# Patient Record
Sex: MALE | Race: WHITE | NOT HISPANIC OR LATINO | Employment: FULL TIME | URBAN - METROPOLITAN AREA
[De-identification: names, ages, dates, MRNs, and addresses within clinical notes are randomized per-mention and may not be internally consistent; named-entity substitution may affect disease eponyms.]

---

## 2021-09-10 ENCOUNTER — HOSPITAL ENCOUNTER (OUTPATIENT)
Dept: RADIOLOGY | Facility: HOSPITAL | Age: 36
Discharge: HOME | End: 2021-09-10
Attending: NURSE PRACTITIONER
Payer: COMMERCIAL

## 2021-09-10 ENCOUNTER — OFFICE VISIT (OUTPATIENT)
Dept: INTERNAL MEDICINE | Facility: CLINIC | Age: 36
End: 2021-09-10
Payer: COMMERCIAL

## 2021-09-10 VITALS
BODY MASS INDEX: 26.64 KG/M2 | DIASTOLIC BLOOD PRESSURE: 86 MMHG | HEART RATE: 73 BPM | HEIGHT: 71 IN | OXYGEN SATURATION: 97 % | WEIGHT: 190.25 LBS | SYSTOLIC BLOOD PRESSURE: 106 MMHG

## 2021-09-10 DIAGNOSIS — M25.562 CHRONIC PAIN OF BOTH KNEES: ICD-10-CM

## 2021-09-10 DIAGNOSIS — M25.561 CHRONIC PAIN OF BOTH KNEES: ICD-10-CM

## 2021-09-10 DIAGNOSIS — G89.29 CHRONIC PAIN OF BOTH KNEES: ICD-10-CM

## 2021-09-10 DIAGNOSIS — S29.9XXA TRAUMA OF CHEST, INITIAL ENCOUNTER: ICD-10-CM

## 2021-09-10 DIAGNOSIS — Z98.890 S/P ACL REPAIR: ICD-10-CM

## 2021-09-10 DIAGNOSIS — S20.221A CONTUSION OF RIGHT BACK WALL OF THORAX, INITIAL ENCOUNTER: ICD-10-CM

## 2021-09-10 DIAGNOSIS — Z98.890 S/P ACL REPAIR: Primary | ICD-10-CM

## 2021-09-10 PROCEDURE — 99999 PR PBB SHADOW E&M-NEW PATIENT-LVL III: ICD-10-PCS | Mod: PBBFAC,,, | Performed by: NURSE PRACTITIONER

## 2021-09-10 PROCEDURE — 1160F PR REVIEW ALL MEDS BY PRESCRIBER/CLIN PHARMACIST DOCUMENTED: ICD-10-PCS | Mod: CPTII,S$GLB,, | Performed by: NURSE PRACTITIONER

## 2021-09-10 PROCEDURE — 73562 X-RAY EXAM OF KNEE 3: CPT | Mod: 26,,, | Performed by: RADIOLOGY

## 2021-09-10 PROCEDURE — 99999 PR PBB SHADOW E&M-NEW PATIENT-LVL III: CPT | Mod: PBBFAC,,, | Performed by: NURSE PRACTITIONER

## 2021-09-10 PROCEDURE — 3079F DIAST BP 80-89 MM HG: CPT | Mod: CPTII,S$GLB,, | Performed by: NURSE PRACTITIONER

## 2021-09-10 PROCEDURE — 3074F SYST BP LT 130 MM HG: CPT | Mod: CPTII,S$GLB,, | Performed by: NURSE PRACTITIONER

## 2021-09-10 PROCEDURE — 1159F MED LIST DOCD IN RCRD: CPT | Mod: CPTII,S$GLB,, | Performed by: NURSE PRACTITIONER

## 2021-09-10 PROCEDURE — 3008F BODY MASS INDEX DOCD: CPT | Mod: CPTII,S$GLB,, | Performed by: NURSE PRACTITIONER

## 2021-09-10 PROCEDURE — 1160F RVW MEDS BY RX/DR IN RCRD: CPT | Mod: CPTII,S$GLB,, | Performed by: NURSE PRACTITIONER

## 2021-09-10 PROCEDURE — 73562 X-RAY EXAM OF KNEE 3: CPT | Mod: TC,50

## 2021-09-10 PROCEDURE — 1159F PR MEDICATION LIST DOCUMENTED IN MEDICAL RECORD: ICD-10-PCS | Mod: CPTII,S$GLB,, | Performed by: NURSE PRACTITIONER

## 2021-09-10 PROCEDURE — 3074F PR MOST RECENT SYSTOLIC BLOOD PRESSURE < 130 MM HG: ICD-10-PCS | Mod: CPTII,S$GLB,, | Performed by: NURSE PRACTITIONER

## 2021-09-10 PROCEDURE — 3079F PR MOST RECENT DIASTOLIC BLOOD PRESSURE 80-89 MM HG: ICD-10-PCS | Mod: CPTII,S$GLB,, | Performed by: NURSE PRACTITIONER

## 2021-09-10 PROCEDURE — 99204 PR OFFICE/OUTPT VISIT, NEW, LEVL IV, 45-59 MIN: ICD-10-PCS | Mod: S$GLB,,, | Performed by: NURSE PRACTITIONER

## 2021-09-10 PROCEDURE — 73562 XR KNEE ORTHO BILAT: ICD-10-PCS | Mod: 26,,, | Performed by: RADIOLOGY

## 2021-09-10 PROCEDURE — 3008F PR BODY MASS INDEX (BMI) DOCUMENTED: ICD-10-PCS | Mod: CPTII,S$GLB,, | Performed by: NURSE PRACTITIONER

## 2021-09-10 PROCEDURE — 99204 OFFICE O/P NEW MOD 45 MIN: CPT | Mod: S$GLB,,, | Performed by: NURSE PRACTITIONER

## 2021-09-24 ENCOUNTER — HOSPITAL ENCOUNTER (OUTPATIENT)
Dept: RADIOLOGY | Facility: HOSPITAL | Age: 36
Discharge: HOME | End: 2021-09-24
Attending: ORTHOPAEDIC SURGERY
Payer: COMMERCIAL

## 2021-09-24 ENCOUNTER — OFFICE VISIT (OUTPATIENT)
Dept: SPORTS MEDICINE | Facility: CLINIC | Age: 36
End: 2021-09-24
Payer: COMMERCIAL

## 2021-09-24 VITALS
HEART RATE: 68 BPM | SYSTOLIC BLOOD PRESSURE: 129 MMHG | HEIGHT: 71 IN | WEIGHT: 191 LBS | BODY MASS INDEX: 26.74 KG/M2 | DIASTOLIC BLOOD PRESSURE: 77 MMHG

## 2021-09-24 DIAGNOSIS — Z98.890 S/P ACL REPAIR: ICD-10-CM

## 2021-09-24 DIAGNOSIS — M25.562 CHRONIC PAIN OF BOTH KNEES: ICD-10-CM

## 2021-09-24 DIAGNOSIS — M25.561 CHRONIC PAIN OF BOTH KNEES: ICD-10-CM

## 2021-09-24 DIAGNOSIS — G89.29 CHRONIC PAIN OF BOTH KNEES: ICD-10-CM

## 2021-09-24 PROCEDURE — 99203 OFFICE O/P NEW LOW 30 MIN: CPT | Mod: S$GLB,,, | Performed by: ORTHOPAEDIC SURGERY

## 2021-09-24 PROCEDURE — 99999 PR PBB SHADOW E&M-EST. PATIENT-LVL III: CPT | Mod: PBBFAC,,, | Performed by: ORTHOPAEDIC SURGERY

## 2021-09-24 PROCEDURE — 73564 X-RAY EXAM KNEE 4 OR MORE: CPT | Mod: TC,50

## 2021-09-24 PROCEDURE — 1159F MED LIST DOCD IN RCRD: CPT | Mod: CPTII,S$GLB,, | Performed by: ORTHOPAEDIC SURGERY

## 2021-09-24 PROCEDURE — 73564 X-RAY EXAM KNEE 4 OR MORE: CPT | Mod: 26,,, | Performed by: RADIOLOGY

## 2021-09-24 PROCEDURE — 3078F PR MOST RECENT DIASTOLIC BLOOD PRESSURE < 80 MM HG: ICD-10-PCS | Mod: CPTII,S$GLB,, | Performed by: ORTHOPAEDIC SURGERY

## 2021-09-24 PROCEDURE — 3008F PR BODY MASS INDEX (BMI) DOCUMENTED: ICD-10-PCS | Mod: CPTII,S$GLB,, | Performed by: ORTHOPAEDIC SURGERY

## 2021-09-24 PROCEDURE — 3008F BODY MASS INDEX DOCD: CPT | Mod: CPTII,S$GLB,, | Performed by: ORTHOPAEDIC SURGERY

## 2021-09-24 PROCEDURE — 99203 PR OFFICE/OUTPT VISIT, NEW, LEVL III, 30-44 MIN: ICD-10-PCS | Mod: S$GLB,,, | Performed by: ORTHOPAEDIC SURGERY

## 2021-09-24 PROCEDURE — 1159F PR MEDICATION LIST DOCUMENTED IN MEDICAL RECORD: ICD-10-PCS | Mod: CPTII,S$GLB,, | Performed by: ORTHOPAEDIC SURGERY

## 2021-09-24 PROCEDURE — 3074F PR MOST RECENT SYSTOLIC BLOOD PRESSURE < 130 MM HG: ICD-10-PCS | Mod: CPTII,S$GLB,, | Performed by: ORTHOPAEDIC SURGERY

## 2021-09-24 PROCEDURE — 73564 XR KNEE ORTHO BILAT WITH FLEXION: ICD-10-PCS | Mod: 26,,, | Performed by: RADIOLOGY

## 2021-09-24 PROCEDURE — 3078F DIAST BP <80 MM HG: CPT | Mod: CPTII,S$GLB,, | Performed by: ORTHOPAEDIC SURGERY

## 2021-09-24 PROCEDURE — 3074F SYST BP LT 130 MM HG: CPT | Mod: CPTII,S$GLB,, | Performed by: ORTHOPAEDIC SURGERY

## 2021-09-24 PROCEDURE — 99999 PR PBB SHADOW E&M-EST. PATIENT-LVL III: ICD-10-PCS | Mod: PBBFAC,,, | Performed by: ORTHOPAEDIC SURGERY

## 2021-10-04 ENCOUNTER — TELEPHONE (OUTPATIENT)
Dept: SPORTS MEDICINE | Facility: CLINIC | Age: 36
End: 2021-10-04

## 2021-10-04 DIAGNOSIS — M25.562 CHRONIC PAIN OF BOTH KNEES: Primary | ICD-10-CM

## 2021-10-04 DIAGNOSIS — M25.561 CHRONIC PAIN OF BOTH KNEES: Primary | ICD-10-CM

## 2021-10-04 DIAGNOSIS — G89.29 CHRONIC PAIN OF BOTH KNEES: Primary | ICD-10-CM

## 2021-10-12 ENCOUNTER — OFFICE VISIT (OUTPATIENT)
Dept: DERMATOLOGY | Facility: CLINIC | Age: 36
End: 2021-10-12
Payer: COMMERCIAL

## 2021-10-12 DIAGNOSIS — B36.0 TINEA VERSICOLOR: Primary | ICD-10-CM

## 2021-10-12 PROCEDURE — 99999 PR PBB SHADOW E&M-EST. PATIENT-LVL II: CPT | Mod: PBBFAC,,, | Performed by: DERMATOLOGY

## 2021-10-12 PROCEDURE — 99204 OFFICE O/P NEW MOD 45 MIN: CPT | Mod: S$GLB,,, | Performed by: DERMATOLOGY

## 2021-10-12 PROCEDURE — 1160F PR REVIEW ALL MEDS BY PRESCRIBER/CLIN PHARMACIST DOCUMENTED: ICD-10-PCS | Mod: CPTII,S$GLB,, | Performed by: DERMATOLOGY

## 2021-10-12 PROCEDURE — 99204 PR OFFICE/OUTPT VISIT, NEW, LEVL IV, 45-59 MIN: ICD-10-PCS | Mod: S$GLB,,, | Performed by: DERMATOLOGY

## 2021-10-12 PROCEDURE — 1159F MED LIST DOCD IN RCRD: CPT | Mod: CPTII,S$GLB,, | Performed by: DERMATOLOGY

## 2021-10-12 PROCEDURE — 1159F PR MEDICATION LIST DOCUMENTED IN MEDICAL RECORD: ICD-10-PCS | Mod: CPTII,S$GLB,, | Performed by: DERMATOLOGY

## 2021-10-12 PROCEDURE — 99999 PR PBB SHADOW E&M-EST. PATIENT-LVL II: ICD-10-PCS | Mod: PBBFAC,,, | Performed by: DERMATOLOGY

## 2021-10-12 PROCEDURE — 1160F RVW MEDS BY RX/DR IN RCRD: CPT | Mod: CPTII,S$GLB,, | Performed by: DERMATOLOGY

## 2021-10-12 RX ORDER — KETOCONAZOLE 20 MG/ML
SHAMPOO, SUSPENSION TOPICAL
Qty: 120 ML | Refills: 5 | Status: SHIPPED | OUTPATIENT
Start: 2021-10-12

## 2021-10-12 RX ORDER — KETOCONAZOLE 20 MG/G
CREAM TOPICAL 2 TIMES DAILY
Qty: 60 G | Refills: 3 | Status: SHIPPED | OUTPATIENT
Start: 2021-10-12

## 2021-10-13 ENCOUNTER — HOSPITAL ENCOUNTER (OUTPATIENT)
Dept: RADIOLOGY | Facility: HOSPITAL | Age: 36
Discharge: HOME | End: 2021-10-13
Attending: ORTHOPAEDIC SURGERY
Payer: COMMERCIAL

## 2021-10-13 DIAGNOSIS — M25.562 CHRONIC PAIN OF BOTH KNEES: ICD-10-CM

## 2021-10-13 DIAGNOSIS — G89.29 CHRONIC PAIN OF BOTH KNEES: ICD-10-CM

## 2021-10-13 DIAGNOSIS — Z98.890 S/P ACL REPAIR: ICD-10-CM

## 2021-10-13 DIAGNOSIS — M25.561 CHRONIC PAIN OF BOTH KNEES: ICD-10-CM

## 2021-10-13 PROCEDURE — 73721 MRI JNT OF LWR EXTRE W/O DYE: CPT | Mod: 26,LT,, | Performed by: RADIOLOGY

## 2021-10-13 PROCEDURE — 73721 MRI KNEE WITHOUT CONTRAST RIGHT: ICD-10-PCS | Mod: 26,RT,, | Performed by: RADIOLOGY

## 2021-10-13 PROCEDURE — 73721 MRI JNT OF LWR EXTRE W/O DYE: CPT | Mod: TC,LT

## 2021-10-13 PROCEDURE — 73721 MRI JNT OF LWR EXTRE W/O DYE: CPT | Mod: TC,RT

## 2021-10-13 PROCEDURE — 73721 MRI KNEE WITHOUT CONTRAST LEFT: ICD-10-PCS | Mod: 26,LT,, | Performed by: RADIOLOGY

## 2021-10-13 PROCEDURE — 73721 MRI JNT OF LWR EXTRE W/O DYE: CPT | Mod: 26,RT,, | Performed by: RADIOLOGY

## 2021-10-15 ENCOUNTER — TELEPHONE (OUTPATIENT)
Dept: SPORTS MEDICINE | Facility: CLINIC | Age: 36
End: 2021-10-15

## 2021-10-19 ENCOUNTER — TELEPHONE (OUTPATIENT)
Dept: SPORTS MEDICINE | Facility: CLINIC | Age: 36
End: 2021-10-19

## 2021-10-21 DIAGNOSIS — M94.262 CHONDROMALACIA OF LEFT KNEE: ICD-10-CM

## 2021-10-21 DIAGNOSIS — M23.204 OLD TEAR OF MEDIAL MENISCUS OF LEFT KNEE: Primary | ICD-10-CM

## 2021-10-21 DIAGNOSIS — M67.52 PLICA SYNDROME OF LEFT KNEE: ICD-10-CM

## 2021-11-29 ENCOUNTER — ANESTHESIA EVENT (OUTPATIENT)
Dept: SURGERY | Facility: HOSPITAL | Age: 36
End: 2021-11-29
Payer: COMMERCIAL

## 2021-11-29 ENCOUNTER — OFFICE VISIT (OUTPATIENT)
Dept: SPORTS MEDICINE | Facility: CLINIC | Age: 36
End: 2021-11-29
Payer: COMMERCIAL

## 2021-11-29 VITALS
DIASTOLIC BLOOD PRESSURE: 75 MMHG | HEART RATE: 63 BPM | BODY MASS INDEX: 25.2 KG/M2 | WEIGHT: 180 LBS | SYSTOLIC BLOOD PRESSURE: 125 MMHG | HEIGHT: 71 IN

## 2021-11-29 DIAGNOSIS — G89.18 POST-OPERATIVE PAIN: ICD-10-CM

## 2021-11-29 DIAGNOSIS — M23.204 OLD COMPLEX TEAR OF MEDIAL MENISCUS OF LEFT KNEE: Primary | ICD-10-CM

## 2021-11-29 PROCEDURE — 99999 PR PBB SHADOW E&M-EST. PATIENT-LVL III: CPT | Mod: PBBFAC,,, | Performed by: PHYSICIAN ASSISTANT

## 2021-11-29 PROCEDURE — 99499 NO LOS: ICD-10-PCS | Mod: S$GLB,,, | Performed by: PHYSICIAN ASSISTANT

## 2021-11-29 PROCEDURE — 99999 PR PBB SHADOW E&M-EST. PATIENT-LVL III: ICD-10-PCS | Mod: PBBFAC,,, | Performed by: PHYSICIAN ASSISTANT

## 2021-11-29 PROCEDURE — 99499 UNLISTED E&M SERVICE: CPT | Mod: S$GLB,,, | Performed by: PHYSICIAN ASSISTANT

## 2021-11-29 RX ORDER — CEFAZOLIN SODIUM 2 G/50ML
2 SOLUTION INTRAVENOUS
Status: CANCELLED | OUTPATIENT
Start: 2021-11-29

## 2021-11-29 RX ORDER — HYDROCODONE BITARTRATE AND ACETAMINOPHEN 10; 325 MG/1; MG/1
TABLET ORAL
Qty: 12 TABLET | Refills: 0 | Status: SHIPPED | OUTPATIENT
Start: 2021-11-29

## 2021-11-29 RX ORDER — PROMETHAZINE HYDROCHLORIDE 25 MG/1
25 TABLET ORAL EVERY 6 HOURS PRN
Qty: 8 TABLET | Refills: 0 | Status: SHIPPED | OUTPATIENT
Start: 2021-11-29

## 2021-11-29 RX ORDER — ASPIRIN 81 MG/1
81 TABLET ORAL DAILY
Qty: 14 TABLET | Refills: 0 | COMMUNITY
Start: 2021-11-29 | End: 2022-11-29

## 2021-11-29 RX ORDER — SODIUM CHLORIDE 9 MG/ML
INJECTION, SOLUTION INTRAVENOUS CONTINUOUS
Status: CANCELLED | OUTPATIENT
Start: 2021-11-29

## 2021-11-30 ENCOUNTER — HOSPITAL ENCOUNTER (OUTPATIENT)
Facility: HOSPITAL | Age: 36
Discharge: HOME | End: 2021-11-30
Attending: ORTHOPAEDIC SURGERY | Admitting: ORTHOPAEDIC SURGERY
Payer: COMMERCIAL

## 2021-11-30 ENCOUNTER — ANESTHESIA (OUTPATIENT)
Dept: SURGERY | Facility: HOSPITAL | Age: 36
End: 2021-11-30
Payer: COMMERCIAL

## 2021-11-30 VITALS
RESPIRATION RATE: 18 BRPM | BODY MASS INDEX: 26.6 KG/M2 | OXYGEN SATURATION: 93 % | TEMPERATURE: 98 F | HEIGHT: 71 IN | DIASTOLIC BLOOD PRESSURE: 81 MMHG | SYSTOLIC BLOOD PRESSURE: 128 MMHG | HEART RATE: 69 BPM | WEIGHT: 190 LBS

## 2021-11-30 DIAGNOSIS — M23.204 OLD COMPLEX TEAR OF MEDIAL MENISCUS OF LEFT KNEE: ICD-10-CM

## 2021-11-30 PROCEDURE — D9220A PRA ANESTHESIA: ICD-10-PCS | Mod: ANES,,, | Performed by: ANESTHESIOLOGY

## 2021-11-30 PROCEDURE — D9220A PRA ANESTHESIA: Mod: CRNA,,, | Performed by: NURSE ANESTHETIST, CERTIFIED REGISTERED

## 2021-11-30 PROCEDURE — 99900035 HC TECH TIME PER 15 MIN (STAT)

## 2021-11-30 PROCEDURE — D9220A PRA ANESTHESIA: Mod: ANES,,, | Performed by: ANESTHESIOLOGY

## 2021-11-30 PROCEDURE — 25000003 PHARM REV CODE 250: Performed by: PHYSICIAN ASSISTANT

## 2021-11-30 PROCEDURE — 25000003 PHARM REV CODE 250: Performed by: ORTHOPAEDIC SURGERY

## 2021-11-30 PROCEDURE — 63600175 PHARM REV CODE 636 W HCPCS: Performed by: ORTHOPAEDIC SURGERY

## 2021-11-30 PROCEDURE — 71000039 HC RECOVERY, EACH ADD'L HOUR: Performed by: ORTHOPAEDIC SURGERY

## 2021-11-30 PROCEDURE — D9220A PRA ANESTHESIA: ICD-10-PCS | Mod: CRNA,,, | Performed by: NURSE ANESTHETIST, CERTIFIED REGISTERED

## 2021-11-30 PROCEDURE — 71000015 HC POSTOP RECOV 1ST HR: Performed by: ORTHOPAEDIC SURGERY

## 2021-11-30 PROCEDURE — 71000033 HC RECOVERY, INTIAL HOUR: Performed by: ORTHOPAEDIC SURGERY

## 2021-11-30 PROCEDURE — 25000003 PHARM REV CODE 250: Performed by: NURSE ANESTHETIST, CERTIFIED REGISTERED

## 2021-11-30 PROCEDURE — 37000008 HC ANESTHESIA 1ST 15 MINUTES: Performed by: ORTHOPAEDIC SURGERY

## 2021-11-30 PROCEDURE — 63600175 PHARM REV CODE 636 W HCPCS: Performed by: PHYSICIAN ASSISTANT

## 2021-11-30 PROCEDURE — 36000711: Performed by: ORTHOPAEDIC SURGERY

## 2021-11-30 PROCEDURE — 94761 N-INVAS EAR/PLS OXIMETRY MLT: CPT

## 2021-11-30 PROCEDURE — 27201423 OPTIME MED/SURG SUP & DEVICES STERILE SUPPLY: Performed by: ORTHOPAEDIC SURGERY

## 2021-11-30 PROCEDURE — 63600175 PHARM REV CODE 636 W HCPCS: Performed by: NURSE ANESTHETIST, CERTIFIED REGISTERED

## 2021-11-30 PROCEDURE — 36000710: Performed by: ORTHOPAEDIC SURGERY

## 2021-11-30 PROCEDURE — 29880 PR KNEE SCOPE MED/LAT MENISCECTOMY: ICD-10-PCS | Mod: LT,,, | Performed by: ORTHOPAEDIC SURGERY

## 2021-11-30 PROCEDURE — 37000009 HC ANESTHESIA EA ADD 15 MINS: Performed by: ORTHOPAEDIC SURGERY

## 2021-11-30 PROCEDURE — 29880 ARTHRS KNE SRG MNISECTMY M&L: CPT | Mod: LT,,, | Performed by: ORTHOPAEDIC SURGERY

## 2021-11-30 RX ORDER — PROPOFOL 10 MG/ML
VIAL (ML) INTRAVENOUS
Status: DISCONTINUED | OUTPATIENT
Start: 2021-11-30 | End: 2021-11-30

## 2021-11-30 RX ORDER — MORPHINE SULFATE 2 MG/ML
2 INJECTION, SOLUTION INTRAMUSCULAR; INTRAVENOUS EVERY 10 MIN PRN
Status: DISCONTINUED | OUTPATIENT
Start: 2021-11-30 | End: 2021-11-30 | Stop reason: HOSPADM

## 2021-11-30 RX ORDER — CEFAZOLIN SODIUM 1 G/3ML
2 INJECTION, POWDER, FOR SOLUTION INTRAMUSCULAR; INTRAVENOUS
Status: COMPLETED | OUTPATIENT
Start: 2021-11-30 | End: 2021-11-30

## 2021-11-30 RX ORDER — SODIUM CHLORIDE 9 MG/ML
INJECTION, SOLUTION INTRAVENOUS CONTINUOUS
Status: DISCONTINUED | OUTPATIENT
Start: 2021-11-30 | End: 2021-11-30 | Stop reason: HOSPADM

## 2021-11-30 RX ORDER — TRAMADOL HYDROCHLORIDE 50 MG/1
100 TABLET ORAL EVERY 6 HOURS PRN
Status: DISCONTINUED | OUTPATIENT
Start: 2021-11-30 | End: 2021-11-30 | Stop reason: HOSPADM

## 2021-11-30 RX ORDER — CARBOXYMETHYLCELLULOSE SODIUM 5 MG/ML
SOLUTION/ DROPS OPHTHALMIC
Status: DISCONTINUED | OUTPATIENT
Start: 2021-11-30 | End: 2021-11-30

## 2021-11-30 RX ORDER — SODIUM CHLORIDE 0.9 % (FLUSH) 0.9 %
10 SYRINGE (ML) INJECTION
Status: DISCONTINUED | OUTPATIENT
Start: 2021-11-30 | End: 2021-11-30 | Stop reason: HOSPADM

## 2021-11-30 RX ORDER — OXYCODONE HYDROCHLORIDE 5 MG/1
10 TABLET ORAL EVERY 4 HOURS PRN
Status: DISCONTINUED | OUTPATIENT
Start: 2021-11-30 | End: 2021-11-30 | Stop reason: HOSPADM

## 2021-11-30 RX ORDER — KETAMINE HYDROCHLORIDE 100 MG/ML
INJECTION, SOLUTION INTRAMUSCULAR; INTRAVENOUS
Status: DISCONTINUED | OUTPATIENT
Start: 2021-11-30 | End: 2021-11-30 | Stop reason: HOSPADM

## 2021-11-30 RX ORDER — EPINEPHRINE 1 MG/ML
INJECTION, SOLUTION INTRACARDIAC; INTRAMUSCULAR; INTRAVENOUS; SUBCUTANEOUS
Status: DISCONTINUED | OUTPATIENT
Start: 2021-11-30 | End: 2021-11-30 | Stop reason: HOSPADM

## 2021-11-30 RX ORDER — LIDOCAINE HYDROCHLORIDE 20 MG/ML
INJECTION INTRAVENOUS
Status: DISCONTINUED | OUTPATIENT
Start: 2021-11-30 | End: 2021-11-30

## 2021-11-30 RX ORDER — FENTANYL CITRATE 50 UG/ML
INJECTION, SOLUTION INTRAMUSCULAR; INTRAVENOUS
Status: DISCONTINUED | OUTPATIENT
Start: 2021-11-30 | End: 2021-11-30

## 2021-11-30 RX ORDER — FAMOTIDINE 10 MG/ML
INJECTION INTRAVENOUS
Status: DISCONTINUED | OUTPATIENT
Start: 2021-11-30 | End: 2021-11-30

## 2021-11-30 RX ORDER — DEXAMETHASONE SODIUM PHOSPHATE 4 MG/ML
INJECTION, SOLUTION INTRA-ARTICULAR; INTRALESIONAL; INTRAMUSCULAR; INTRAVENOUS; SOFT TISSUE
Status: DISCONTINUED | OUTPATIENT
Start: 2021-11-30 | End: 2021-11-30

## 2021-11-30 RX ORDER — ONDANSETRON 2 MG/ML
4 INJECTION INTRAMUSCULAR; INTRAVENOUS EVERY 12 HOURS PRN
Status: DISCONTINUED | OUTPATIENT
Start: 2021-11-30 | End: 2021-11-30 | Stop reason: HOSPADM

## 2021-11-30 RX ORDER — PROMETHAZINE HYDROCHLORIDE 25 MG/1
25 TABLET ORAL EVERY 6 HOURS PRN
Status: DISCONTINUED | OUTPATIENT
Start: 2021-11-30 | End: 2021-11-30 | Stop reason: HOSPADM

## 2021-11-30 RX ORDER — HYDROMORPHONE HYDROCHLORIDE 1 MG/ML
0.2 INJECTION, SOLUTION INTRAMUSCULAR; INTRAVENOUS; SUBCUTANEOUS EVERY 5 MIN PRN
Status: DISCONTINUED | OUTPATIENT
Start: 2021-11-30 | End: 2021-11-30 | Stop reason: HOSPADM

## 2021-11-30 RX ORDER — MIDAZOLAM HYDROCHLORIDE 1 MG/ML
INJECTION, SOLUTION INTRAMUSCULAR; INTRAVENOUS
Status: DISCONTINUED | OUTPATIENT
Start: 2021-11-30 | End: 2021-11-30

## 2021-11-30 RX ORDER — KETAMINE HCL IN 0.9 % NACL 50 MG/5 ML
SYRINGE (ML) INTRAVENOUS
Status: DISCONTINUED | OUTPATIENT
Start: 2021-11-30 | End: 2021-11-30

## 2021-11-30 RX ORDER — ONDANSETRON 2 MG/ML
INJECTION INTRAMUSCULAR; INTRAVENOUS
Status: DISCONTINUED | OUTPATIENT
Start: 2021-11-30 | End: 2021-11-30

## 2021-11-30 RX ORDER — KETOROLAC TROMETHAMINE 30 MG/ML
INJECTION, SOLUTION INTRAMUSCULAR; INTRAVENOUS
Status: DISCONTINUED | OUTPATIENT
Start: 2021-11-30 | End: 2021-11-30 | Stop reason: HOSPADM

## 2021-11-30 RX ORDER — ROPIVACAINE HYDROCHLORIDE 5 MG/ML
INJECTION, SOLUTION EPIDURAL; INFILTRATION; PERINEURAL
Status: DISCONTINUED | OUTPATIENT
Start: 2021-11-30 | End: 2021-11-30 | Stop reason: HOSPADM

## 2021-11-30 RX ADMIN — Medication 30 MG: at 11:11

## 2021-11-30 RX ADMIN — ONDANSETRON 4 MG: 2 INJECTION, SOLUTION INTRAMUSCULAR; INTRAVENOUS at 12:11

## 2021-11-30 RX ADMIN — FENTANYL CITRATE 100 MCG: 50 INJECTION, SOLUTION INTRAMUSCULAR; INTRAVENOUS at 11:11

## 2021-11-30 RX ADMIN — PROPOFOL 50 MG: 10 INJECTION, EMULSION INTRAVENOUS at 11:11

## 2021-11-30 RX ADMIN — CARBOXYMETHYLCELLULOSE SODIUM 2 DROP: 5 SOLUTION/ DROPS OPHTHALMIC at 11:11

## 2021-11-30 RX ADMIN — DEXAMETHASONE SODIUM PHOSPHATE 8 MG: 4 INJECTION, SOLUTION INTRAMUSCULAR; INTRAVENOUS at 11:11

## 2021-11-30 RX ADMIN — FAMOTIDINE 20 MG: 10 INJECTION, SOLUTION INTRAVENOUS at 11:11

## 2021-11-30 RX ADMIN — LIDOCAINE HYDROCHLORIDE 100 MG: 20 INJECTION, SOLUTION INTRAVENOUS at 11:11

## 2021-11-30 RX ADMIN — OXYCODONE 10 MG: 5 TABLET ORAL at 01:11

## 2021-11-30 RX ADMIN — PROPOFOL 200 MG: 10 INJECTION, EMULSION INTRAVENOUS at 11:11

## 2021-11-30 RX ADMIN — CEFAZOLIN 2 G: 330 INJECTION, POWDER, FOR SOLUTION INTRAMUSCULAR; INTRAVENOUS at 11:11

## 2021-11-30 RX ADMIN — Medication 10 MG: at 12:11

## 2021-11-30 RX ADMIN — MIDAZOLAM HYDROCHLORIDE 2 MG: 1 INJECTION, SOLUTION INTRAMUSCULAR; INTRAVENOUS at 11:11

## 2021-11-30 RX ADMIN — SODIUM CHLORIDE: 0.9 INJECTION, SOLUTION INTRAVENOUS at 10:11

## 2021-12-03 ENCOUNTER — CLINICAL SUPPORT (OUTPATIENT)
Dept: REHABILITATION | Facility: HOSPITAL | Age: 36
End: 2021-12-03
Payer: COMMERCIAL

## 2021-12-03 DIAGNOSIS — M25.561 CHRONIC PAIN OF RIGHT KNEE: ICD-10-CM

## 2021-12-03 DIAGNOSIS — G89.29 CHRONIC PAIN OF RIGHT KNEE: ICD-10-CM

## 2021-12-03 DIAGNOSIS — M23.204 OLD COMPLEX TEAR OF MEDIAL MENISCUS OF LEFT KNEE: ICD-10-CM

## 2021-12-03 PROCEDURE — 97161 PT EVAL LOW COMPLEX 20 MIN: CPT

## 2021-12-03 PROCEDURE — 97110 THERAPEUTIC EXERCISES: CPT

## 2021-12-06 PROBLEM — M25.561 RIGHT KNEE PAIN: Status: ACTIVE | Noted: 2021-12-06

## 2021-12-10 ENCOUNTER — CLINICAL SUPPORT (OUTPATIENT)
Dept: REHABILITATION | Facility: HOSPITAL | Age: 36
End: 2021-12-10
Payer: COMMERCIAL

## 2021-12-10 DIAGNOSIS — G89.29 CHRONIC PAIN OF RIGHT KNEE: ICD-10-CM

## 2021-12-10 DIAGNOSIS — M25.561 CHRONIC PAIN OF RIGHT KNEE: ICD-10-CM

## 2021-12-10 PROCEDURE — 97110 THERAPEUTIC EXERCISES: CPT

## 2021-12-13 ENCOUNTER — OFFICE VISIT (OUTPATIENT)
Dept: SPORTS MEDICINE | Facility: CLINIC | Age: 36
End: 2021-12-13
Payer: COMMERCIAL

## 2021-12-13 VITALS
HEART RATE: 70 BPM | DIASTOLIC BLOOD PRESSURE: 80 MMHG | HEIGHT: 71 IN | SYSTOLIC BLOOD PRESSURE: 130 MMHG | WEIGHT: 187 LBS | BODY MASS INDEX: 26.18 KG/M2

## 2021-12-13 DIAGNOSIS — Z98.890 S/P ARTHROSCOPY OF LEFT KNEE: Primary | ICD-10-CM

## 2021-12-13 PROCEDURE — 99999 PR PBB SHADOW E&M-EST. PATIENT-LVL III: CPT | Mod: PBBFAC,,, | Performed by: PHYSICIAN ASSISTANT

## 2021-12-13 PROCEDURE — 99024 PR POST-OP FOLLOW-UP VISIT: ICD-10-PCS | Mod: S$GLB,,, | Performed by: PHYSICIAN ASSISTANT

## 2021-12-13 PROCEDURE — 99024 POSTOP FOLLOW-UP VISIT: CPT | Mod: S$GLB,,, | Performed by: PHYSICIAN ASSISTANT

## 2021-12-13 PROCEDURE — 99999 PR PBB SHADOW E&M-EST. PATIENT-LVL III: ICD-10-PCS | Mod: PBBFAC,,, | Performed by: PHYSICIAN ASSISTANT

## 2021-12-30 ENCOUNTER — CLINICAL SUPPORT (OUTPATIENT)
Dept: REHABILITATION | Facility: HOSPITAL | Age: 36
End: 2021-12-30
Payer: COMMERCIAL

## 2021-12-30 DIAGNOSIS — M25.561 CHRONIC PAIN OF RIGHT KNEE: ICD-10-CM

## 2021-12-30 DIAGNOSIS — G89.29 CHRONIC PAIN OF RIGHT KNEE: ICD-10-CM

## 2021-12-30 PROCEDURE — 97110 THERAPEUTIC EXERCISES: CPT

## 2022-01-05 NOTE — PROGRESS NOTES
"    Physical Therapy Daily Treatment Note     Name: Al Moreno  Clinic Number: [id number]    Therapy Diagnosis:   Encounter Diagnosis   Name Primary?    Chronic pain of right knee      Physician: Rachid Mclain III, *    Visit Date: 12/30/2021    Physician Orders: PT Eval and Treat   Medical Diagnosis from Referral:M23.204 (ICD-10-CM) - Old complex tear of medial meniscus of left knee  Evaluation Date: 12/3/2021  Authorization Period Expiration: 12/31/2021                                   Plan of Care Expiration: 12/31/2021  Visit # / Visits authorized:2/ 20    Time In: 300  Time Out: 400  Total Billable Time: 60 minutes    Precautions: Standard    POSTOPERATIVE PLAN: We will be following the arthroscopic partial meniscectomy guidelines with emphasis on patellar mobility    Subjective     Pt reports: he missed last few appts 2/2 traveling and being sick. Knee has been feeling somewhat better, continued soreness with stairs and standing/ walking for extended periods.   He was not compliant with home exercise program.  Response to previous treatment/Functional change: improved mobility    Pain: 0/10  Location: left knee      Objective     Range of Motion:   Knee Right Left   Active 2-0-135 2-0-135   Passive 5-0-135 5-0-135      Al received therapeutic exercises to develop strength, endurance, ROM and flexibility for 60 minutes including:    Bike 5 min lvl 5 for tissue extensibility/ pain modulation  Standing gastroc stretch 3x30"  Prone quad stretch 3x30"  SAQ 2x20 3" hold  SLR w 3" hold 3x10  DL bridge GTB 2x10  GTB clamshell 2x10  SL bridge 2x10  LAQ 10# 4x10  GTB side steps 2 laps w/ 8# med ball  GTB DL squats 8# med ball 3x10  SL squat 3x10  4 inch lateral step down 3x6 ea     NP today  Shuttle press   2 black, 1 red DL 2x20   2 black, 1 red R SL 3x10   1 black. 1 red L SL 3x10   1 black, 1 red L sidelying 3x10  SL hip abduction 3x10 ea     Home Exercises Provided and Patient Education Provided     Education " provided:   - POC, prognosis, HEP, activity modifications  -Progress toward goals     Written Home Exercises Provided: yes.  Exercises were reviewed and Al was able to demonstrate them prior to the end of the session.  Al demonstrated good  understanding of the education provided.     See EMR under Media for exercises provided 12/10/2021.    Assessment   Al presents with good carryover of ROM/ quad tone. Pt tolerates progressed open/closed chain strengthening well. Pt reports improved confidence/ tolerance to single leg squat following open chain quad strengthening. Pt educated on programming progressed HEP and criteria to start return to run progression.    Pt prognosis is Good.     Pt will continue to benefit from skilled outpatient physical therapy to address the deficits listed in the problem list box on initial evaluation, provide pt/family education and to maximize pt's level of independence in the home and community environment.     Pt's spiritual, cultural and educational needs considered and pt agreeable to plan of care and goals.     Anticipated barriers to physical therapy: none    Goals:  Short Term Goals: 6 weeks   1. Patient will demonstrate compliance with HEP at least 4 days/week to maximize therapeutic potential  2. Patient will demonstrate at least a 1 cm decrease in L knee edema  3. Patient will demonstrate less than 10% ROM discrepency R knee to L knee   4. Patient will tolerate DL dead lift with 20 lbs, exhibiting proper body mechanics     Long Term Goals: 12 weeks   1. Patient to demonstrate full knee ROM, exhibiting symmetrical knee ROM (left knee to right knee)  2. Patient will be able to complete work-related duties with less than 2/10 pain  3. Patient will demonstrate at least a 2 cm decrease in L knee edema, exhibiting symmetrical knee girth in B knees  4. Patient will tolerate DL dead lift with 20 lbs, exhibiting proper body mechanics      Plan       Continue with established Plan  of Care towards PT goals.     Dk Shay, PT, DPT, SCS

## 2022-01-10 ENCOUNTER — OFFICE VISIT (OUTPATIENT)
Dept: SPORTS MEDICINE | Facility: CLINIC | Age: 37
End: 2022-01-10
Payer: COMMERCIAL

## 2022-01-10 VITALS
BODY MASS INDEX: 26.18 KG/M2 | SYSTOLIC BLOOD PRESSURE: 113 MMHG | HEART RATE: 78 BPM | DIASTOLIC BLOOD PRESSURE: 74 MMHG | HEIGHT: 71 IN | WEIGHT: 187 LBS

## 2022-01-10 DIAGNOSIS — Z98.890 S/P ARTHROSCOPY OF LEFT KNEE: Primary | ICD-10-CM

## 2022-01-10 PROCEDURE — 99024 PR POST-OP FOLLOW-UP VISIT: ICD-10-PCS | Mod: S$GLB,,, | Performed by: ORTHOPAEDIC SURGERY

## 2022-01-10 PROCEDURE — 1159F PR MEDICATION LIST DOCUMENTED IN MEDICAL RECORD: ICD-10-PCS | Mod: CPTII,S$GLB,, | Performed by: ORTHOPAEDIC SURGERY

## 2022-01-10 PROCEDURE — 99999 PR PBB SHADOW E&M-EST. PATIENT-LVL III: CPT | Mod: PBBFAC,,, | Performed by: ORTHOPAEDIC SURGERY

## 2022-01-10 PROCEDURE — 3078F PR MOST RECENT DIASTOLIC BLOOD PRESSURE < 80 MM HG: ICD-10-PCS | Mod: CPTII,S$GLB,, | Performed by: ORTHOPAEDIC SURGERY

## 2022-01-10 PROCEDURE — 3074F SYST BP LT 130 MM HG: CPT | Mod: CPTII,S$GLB,, | Performed by: ORTHOPAEDIC SURGERY

## 2022-01-10 PROCEDURE — 3074F PR MOST RECENT SYSTOLIC BLOOD PRESSURE < 130 MM HG: ICD-10-PCS | Mod: CPTII,S$GLB,, | Performed by: ORTHOPAEDIC SURGERY

## 2022-01-10 PROCEDURE — 3008F PR BODY MASS INDEX (BMI) DOCUMENTED: ICD-10-PCS | Mod: CPTII,S$GLB,, | Performed by: ORTHOPAEDIC SURGERY

## 2022-01-10 PROCEDURE — 3078F DIAST BP <80 MM HG: CPT | Mod: CPTII,S$GLB,, | Performed by: ORTHOPAEDIC SURGERY

## 2022-01-10 PROCEDURE — 1159F MED LIST DOCD IN RCRD: CPT | Mod: CPTII,S$GLB,, | Performed by: ORTHOPAEDIC SURGERY

## 2022-01-10 PROCEDURE — 3008F BODY MASS INDEX DOCD: CPT | Mod: CPTII,S$GLB,, | Performed by: ORTHOPAEDIC SURGERY

## 2022-01-10 PROCEDURE — 99024 POSTOP FOLLOW-UP VISIT: CPT | Mod: S$GLB,,, | Performed by: ORTHOPAEDIC SURGERY

## 2022-01-10 PROCEDURE — 99999 PR PBB SHADOW E&M-EST. PATIENT-LVL III: ICD-10-PCS | Mod: PBBFAC,,, | Performed by: ORTHOPAEDIC SURGERY

## 2022-01-10 NOTE — PROGRESS NOTES
CC: Left knee pain    History of present illness: Pt is here today for post-operative followup of knee arthroscopy.  He is doing well.  We have reviewed his findings and discussed plan of care and future treatment options.       Patient is doing well.   He has not taken any pain medications post-operatively.   No pain reported today.   Doing PT at [facility].     SANE 95  SANE preop 50    DATE OF PROCEDURE: 11/30/2021  SURGEON:  Michelle Sierra M.D  PROCEDURE PERFORMED:   left  1. knee arthroscopic chondroplasty (CPT 54074)  2. knee arthroscopic medial and lateral (CPT 94684) meniscectomy   3. knee arthroscopic partial synovectomy/debridement (CPT 16723).   4. knee arthroscopic plica excision(CPT 27852).    5. Knee arthroscopic lysis of adhesions (CPT 38189)    In the patellofemoral compartment, there was chondral damage to:  Patella 10 x 10 mm grade 2  Chondroplasty was performed using arthroscopic shaver.                                                                               PHYSICAL EXAMINATION:     Incision sites healed well, patient notes he has been doing scar massage.   No evidence of any erythema, infection or induration  Range of motion -2-140 degrees  Minimal effusion  2+ DP pulse  No swelling, no calf tenderness  - Le's sign  Negative medial joint line tenderness  Mild quad atrophy                                                                                 ASSESSMENT:                                                                                                                                               1. Status post above, doing well.                                                                                                                               PLAN:                                                                                                                                                     1. Continue with PT  2. Emphasized quad function.  3. I have discussed return to  activity in detail.  4.Patient will see us back at 6 week post-op avis only as needed.                                    5. All questions were answered and patient should contact us if he  has any questions or concerns in the interim.

## 2022-01-13 NOTE — PROGRESS NOTES
"    Physical Therapy Daily Treatment Note     Name: Al Moreno  Clinic Number: [id number]    Therapy Diagnosis:   Encounter Diagnosis   Name Primary?    Chronic pain of right knee      Physician: Rachid Mclain III, *    Visit Date: 1/14/2022    Physician Orders: PT Eval and Treat   Medical Diagnosis from Referral:M23.204 (ICD-10-CM) - Old complex tear of medial meniscus of left knee  Evaluation Date: 12/3/2021  Authorization Period Expiration: 12/31/2021                                   Plan of Care Expiration: 12/31/2021  Visit # / Visits authorized:1/ 20    Time In: 300  Time Out: 400  Total Billable Time: 60 minutes    Precautions: Standard    POSTOPERATIVE PLAN: We will be following the arthroscopic partial meniscectomy guidelines with emphasis on patellar mobility    Subjective     Pt reports: he felt good after last visit. Pt reports continued pain in infrapatellar fat pad where portal site is. Pt reports he feels it more with weight bearing / closed chain exercises vs SLR/quad set  He was not compliant with home exercise program.  Response to previous treatment/Functional change: cont pain at portal site during weight bearing  Pain: 0/10  Location: left knee      Objective     Range of Motion:   Knee Right Left   Active 2-0-135 2-0-135   Passive 5-0-135 5-0-135      Al received therapeutic exercises to develop strength, endurance, ROM and flexibility for 60 minutes including:    Bike 5 min lvl 5 for tissue extensibility/ pain modulation  Standing gastroc stretch 3x30"  SAQ 2x20 3" hold  SLR w 3" hold 3x10  SL hip abduction 3x10 ea   Prone quad stretch 3x30"  Quad isometric at 60 deg flexion 30" hold x10  GTB side steps 2 laps w/ 8# med ball  GTB DL squats 8# med ball on slant board 3x10  SL squat w/ sport cord TKE 4x10  Lunge with valgus cue from sport cord 3x10    NP today  4 inch lateral step down 3x6 ea   DL bridge GTB 2x10  SL bridge 2x10  GTB clamshell 2x10  LAQ 10# 4x10      Home Exercises " Provided and Patient Education Provided     Education provided:   - POC, prognosis, HEP, activity modifications  -Progress toward goals     Written Home Exercises Provided: yes.  Exercises were reviewed and Al was able to demonstrate them prior to the end of the session.  Al demonstrated good  understanding of the education provided.     See EMR under Media for exercises provided 12/10/2021.    Assessment   Al presents with full ROM at knee. Pt reports continued pain at portal site during passive hyperextension, pain improved with scar mobilization. Pt initially reported pain with single leg squat, following open chain activation and band cuing for valgus/ quad activation at TKE, pt reports reduction in pain. Will re-assess quad/ hip strength next visit and progress accordingly.    Pt prognosis is Good.     Pt will continue to benefit from skilled outpatient physical therapy to address the deficits listed in the problem list box on initial evaluation, provide pt/family education and to maximize pt's level of independence in the home and community environment.     Pt's spiritual, cultural and educational needs considered and pt agreeable to plan of care and goals.     Anticipated barriers to physical therapy: none    Goals:  Short Term Goals: 6 weeks   1. Patient will demonstrate compliance with HEP at least 4 days/week to maximize therapeutic potential  2. Patient will demonstrate at least a 1 cm decrease in L knee edema  3. Patient will demonstrate less than 10% ROM discrepency R knee to L knee   4. Patient will tolerate DL dead lift with 20 lbs, exhibiting proper body mechanics     Long Term Goals: 12 weeks   1. Patient to demonstrate full knee ROM, exhibiting symmetrical knee ROM (left knee to right knee)  2. Patient will be able to complete work-related duties with less than 2/10 pain  3. Patient will demonstrate at least a 2 cm decrease in L knee edema, exhibiting symmetrical knee girth in B knees  4.  Patient will tolerate DL dead lift with 20 lbs, exhibiting proper body mechanics      Plan       Continue with established Plan of Care towards PT goals.     Dk Shay, PT, DPT, SCS

## 2022-01-14 ENCOUNTER — CLINICAL SUPPORT (OUTPATIENT)
Dept: REHABILITATION | Facility: HOSPITAL | Age: 37
End: 2022-01-14
Payer: COMMERCIAL

## 2022-01-14 DIAGNOSIS — G89.29 CHRONIC PAIN OF RIGHT KNEE: ICD-10-CM

## 2022-01-14 DIAGNOSIS — M25.561 CHRONIC PAIN OF RIGHT KNEE: ICD-10-CM

## 2022-01-14 PROCEDURE — 97110 THERAPEUTIC EXERCISES: CPT

## 2022-01-28 ENCOUNTER — CLINICAL SUPPORT (OUTPATIENT)
Dept: REHABILITATION | Facility: HOSPITAL | Age: 37
End: 2022-01-28
Payer: COMMERCIAL

## 2022-01-28 DIAGNOSIS — M25.561 CHRONIC PAIN OF RIGHT KNEE: ICD-10-CM

## 2022-01-28 DIAGNOSIS — G89.29 CHRONIC PAIN OF RIGHT KNEE: ICD-10-CM

## 2022-01-28 PROCEDURE — 97110 THERAPEUTIC EXERCISES: CPT

## 2022-01-28 NOTE — PROGRESS NOTES
"  Physical Therapy Daily Treatment Note     Name: Al Moreno  Clinic Number: [id number]    Therapy Diagnosis:   Encounter Diagnosis   Name Primary?    Chronic pain of right knee      Physician: Rachid Mclain III, *    Visit Date: 1/28/2022    Physician Orders: PT Eval and Treat   Medical Diagnosis from Referral:M23.204 (ICD-10-CM) - Old complex tear of medial meniscus of left knee  Evaluation Date: 12/3/2021  Authorization Period Expiration: 12/31/2021                                   Plan of Care Expiration: 12/31/2021  Visit # / Visits authorized:1/ 20    Time In: 300  Time Out: 400  Total Billable Time: 60 minutes    Precautions: Standard    POSTOPERATIVE PLAN: We will be following the arthroscopic partial meniscectomy guidelines with emphasis on patellar mobility    Subjective     Pt reports: he felt good after last visit. Pt reports continued pain in infrapatellar fat pad where portal site is. Pt reports he feels it more with weight bearing / closed chain exercises vs SLR/quad set  He was not compliant with home exercise program.  Response to previous treatment/Functional change: cont pain at portal site during weight bearing  Pain: 0/10  Location: left knee      Objective     Range of Motion:   Knee Right Left   Active 2-0-135 2-0-135   Passive 5-0-135 5-0-135     Anterior Y-balance reach  R = 51 cm  L = 46 cm    Al received therapeutic exercises to develop strength, endurance, ROM and flexibility for 60 minutes including:    Bike 5 min lvl 5 for tissue extensibility/ pain modulation  Prone quad stretch 3x30"  SAQ 2x20 3" hold  SLR w 3" hold 3x10  Cat/cow x10  Deadbug 2x10  Plank 3x15"  DL bridge GTB 2x10  SL bridge 2x10  SL hip abduction 3x10 ea    GTB clamshell 2x10  LAQ 10# in supine 4'  Prone FRANCISCA 2x10  Prone hip extension 2x10  GTB side steps 2 laps  Y-balance PM/PL reaches w/ 10# medball 2x10  SLDL w/ 10# medball 3x10      NP today  4 inch lateral step down 3x6 ea   Quad isometric at 60 deg " "flexion 30" hold x10   w/ 8# med ball  GTB DL squats 8# med ball on slant board 3x10  SL squat w/ sport cord TKE 4x10  Lunge with valgus cue from sport cord 3x10      Home Exercises Provided and Patient Education Provided     Education provided:   - POC, prognosis, HEP, activity modifications  -Progress toward goals     Written Home Exercises Provided: yes.  Exercises were reviewed and Al was able to demonstrate them prior to the end of the session.  Al demonstrated good  understanding of the education provided.     See EMR under Media for exercises provided 12/10/2021.    Assessment   Al presents with good carryover of knee ROM. Pt demonstrates good strength in closed chain demonstrated on Y balance reach. Pt continues to demonstrate knee valgus with functional exercises. Pt reports difficulty feeling glute training effect in open/closed chain. Pt's next goal is to start return to run program, discussed with patient importance of improving glute activation prior to starting return to run progression. Will re-assess closed chain strength and potentially initiate return to run program.    Pt prognosis is Good.     Pt will continue to benefit from skilled outpatient physical therapy to address the deficits listed in the problem list box on initial evaluation, provide pt/family education and to maximize pt's level of independence in the home and community environment.     Pt's spiritual, cultural and educational needs considered and pt agreeable to plan of care and goals.     Anticipated barriers to physical therapy: none    Goals:  Short Term Goals: 6 weeks   1. Patient will demonstrate compliance with HEP at least 4 days/week to maximize therapeutic potential  2. Patient will demonstrate at least a 1 cm decrease in L knee edema  3. Patient will demonstrate less than 10% ROM discrepency R knee to L knee   4. Patient will tolerate DL dead lift with 20 lbs, exhibiting proper body mechanics     Long Term Goals: 12 " weeks   1. Patient to demonstrate full knee ROM, exhibiting symmetrical knee ROM (left knee to right knee)  2. Patient will be able to complete work-related duties with less than 2/10 pain  3. Patient will demonstrate at least a 2 cm decrease in L knee edema, exhibiting symmetrical knee girth in B knees  4. Patient will tolerate DL dead lift with 20 lbs, exhibiting proper body mechanics      Plan       Continue with established Plan of Care towards PT goals.     Dk Shay, PT, DPT, SCS

## 2022-02-04 ENCOUNTER — CLINICAL SUPPORT (OUTPATIENT)
Dept: REHABILITATION | Facility: HOSPITAL | Age: 37
End: 2022-02-04
Payer: COMMERCIAL

## 2022-02-04 DIAGNOSIS — G89.29 CHRONIC PAIN OF RIGHT KNEE: ICD-10-CM

## 2022-02-04 DIAGNOSIS — M25.561 CHRONIC PAIN OF RIGHT KNEE: ICD-10-CM

## 2022-02-04 PROCEDURE — 97110 THERAPEUTIC EXERCISES: CPT

## 2022-02-09 NOTE — PROGRESS NOTES
"  Physical Therapy Daily Treatment Note     Name: Al Moreno  Clinic Number: [id number]    Therapy Diagnosis:   Encounter Diagnosis   Name Primary?    Chronic pain of right knee      Physician: Rachid Mclain III, *    Visit Date: 2/4/2022    Physician Orders: PT Eval and Treat   Medical Diagnosis from Referral:M23.204 (ICD-10-CM) - Old complex tear of medial meniscus of left knee  Evaluation Date: 12/3/2021  Authorization Period Expiration: 12/31/2021                                   Plan of Care Expiration: 12/31/2021  Visit # / Visits authorized:3/ 20    Time In: 300  Time Out: 400  Total Billable Time: 60 minutes    Precautions: Standard    POSTOPERATIVE PLAN: We will be following the arthroscopic partial meniscectomy guidelines with emphasis on patellar mobility    Subjective     Pt reports: knee is feeling good. Tolerating exercise progressions well without reports of pain. Hasn't felt any pain since last visit  He was compliant with home exercise program.  Response to previous treatment/Functional change: dec pain, tolerating HEP well  Pain: 0/10  Location: left knee      Objective     Range of Motion:   Knee Right Left   Active 2-0-135 2-0-135   Passive 5-0-135 5-0-135     Al received therapeutic exercises to develop strength, endurance, ROM and flexibility for 60 minutes including:    Bike 5 min lvl 5 for tissue extensibility/ pain modulation  Cat/cow x10  Deadbug 2x10  Plank 3x15"  DL bridge GTB 2x10  SL bridge 2x10  SL hip abduction 3x10 ea    GTB clamshell 2x10  Prone FRANCISCA 2x10  Prone hip extension 2x10  Hip hinge w/ sport cord at waist 3x20  SLDL w/ foam roller cue 3x10  6 inch lateral step down 3x10    Lunge with valgus cue from sport cord 3x10  Shuttle press DL jumps 2 cord 3x10  Shuttle press SL hops 1.5 cord 3x10    NP today  Prone quad stretch 3x30"  SAQ 2x20 3" hold  SLR w 3" hold 3x10  Quad isometric at 60 deg flexion 30" hold x10   w/ 8# med ball  GTB DL squats 8# med ball on slant board " 3x10  SL squat w/ sport cord TKE 4x10    Home Exercises Provided and Patient Education Provided     Education provided:   - POC, prognosis, HEP, activity modifications  -Progress toward goals     Written Home Exercises Provided: yes.  Exercises were reviewed and Al was able to demonstrate them prior to the end of the session.  Al demonstrated good  understanding of the education provided.     See EMR under Media for exercises provided 12/10/2021.    Assessment   Al presents with good carryover of glute activation with table exercises. Pt tolerates progressed closed chain strengthening well with improved training effect in glutes. Pt tolerates progressed plyometrics on shuttle well. Will gauge response next visit and potentially progress return to run progression.  Pt prognosis is Good.     Pt will continue to benefit from skilled outpatient physical therapy to address the deficits listed in the problem list box on initial evaluation, provide pt/family education and to maximize pt's level of independence in the home and community environment.     Pt's spiritual, cultural and educational needs considered and pt agreeable to plan of care and goals.     Anticipated barriers to physical therapy: none    Goals:  Short Term Goals: 6 weeks   1. Patient will demonstrate compliance with HEP at least 4 days/week to maximize therapeutic potential  2. Patient will demonstrate at least a 1 cm decrease in L knee edema  3. Patient will demonstrate less than 10% ROM discrepency R knee to L knee   4. Patient will tolerate DL dead lift with 20 lbs, exhibiting proper body mechanics     Long Term Goals: 12 weeks   1. Patient to demonstrate full knee ROM, exhibiting symmetrical knee ROM (left knee to right knee)  2. Patient will be able to complete work-related duties with less than 2/10 pain  3. Patient will demonstrate at least a 2 cm decrease in L knee edema, exhibiting symmetrical knee girth in B knees  4. Patient will tolerate  DL dead lift with 20 lbs, exhibiting proper body mechanics      Plan       Continue with established Plan of Care towards PT goals.     Dk Shay, PT, DPT, SCS

## 2022-02-11 ENCOUNTER — CLINICAL SUPPORT (OUTPATIENT)
Dept: REHABILITATION | Facility: HOSPITAL | Age: 37
End: 2022-02-11
Payer: COMMERCIAL

## 2022-02-11 DIAGNOSIS — M25.561 CHRONIC PAIN OF RIGHT KNEE: ICD-10-CM

## 2022-02-11 DIAGNOSIS — G89.29 CHRONIC PAIN OF RIGHT KNEE: ICD-10-CM

## 2022-02-11 PROCEDURE — 97110 THERAPEUTIC EXERCISES: CPT

## 2022-02-16 NOTE — PROGRESS NOTES
"  Physical Therapy Daily Treatment Note     Name: Al Moreno  Clinic Number: [id number]    Therapy Diagnosis:   Encounter Diagnosis   Name Primary?    Chronic pain of right knee      Physician: Rachid Mclain III, *    Visit Date: 2/11/2022    Physician Orders: PT Eval and Treat   Medical Diagnosis from Referral:M23.204 (ICD-10-CM) - Old complex tear of medial meniscus of left knee  Evaluation Date: 12/3/2021  Authorization Period Expiration: 12/31/2021                                   Plan of Care Expiration: 12/31/2021  Visit # / Visits authorized:3/ 20    Time In: 316 (pt w/ late arrival)  Time Out: 400  Total Billable Time: 44 minutes    Precautions: Standard    POSTOPERATIVE PLAN: We will be following the arthroscopic partial meniscectomy guidelines with emphasis on patellar mobility    Subjective     Pt reports: he had a death in the family so he didn't have as much time to perform HEP. Knee feeling good with all ADL's  He was compliant with home exercise program.  Response to previous treatment/Functional change:  tolerating HEP well  Pain: 0/10  Location: left knee      Objective     Range of Motion:   Knee Right Left   Active 2-0-135 2-0-135   Passive 5-0-135 5-0-135     Al received therapeutic exercises to develop strength, endurance, ROM and flexibility for 46 minutes including:    Bike 5 min lvl 5 for tissue extensibility/ pain modulation  SLR w 3" hold 3x10 2#  SL hip abduction 2# 3x10  Plank 3x15"  DL bridge GTB 2x10  SL bridge 2x10  SL hip abduction 3x10 ea    GTB clamshell 2x10  6 inch lateral step down 3x10    Lateral lunge 3x10    NP today  Cat/cow x10  Deadbug 2x10  Lunge with valgus cue from sport cord 3x10  Shuttle press DL jumps 2 cord 3x10  Shuttle press SL hops 1.5 cord 3x10  Hip hinge w/ sport cord at waist 3x20  Prone FRANCISCA 2x10  Prone hip extension 2x10  Prone quad stretch 3x30"  SAQ 2x20 3" hold  Quad isometric at 60 deg flexion 30" hold x10   w/ 8# med ball  GTB DL squats 8# med " ball on slant board 3x10  SL squat w/ sport cord TKE 4x10    Home Exercises Provided and Patient Education Provided     Education provided:   - POC, prognosis, HEP, activity modifications  -Progress toward goals     Written Home Exercises Provided: yes.  Exercises were reviewed and Al was able to demonstrate them prior to the end of the session.  Al demonstrated good  understanding of the education provided.     See EMR under Media for exercises provided 12/10/2021.    Assessment   Al tolerates open/closed chain glute strengthening well. Pt reports improved training effect in glutes today. Return to run progressions held today 2/2 limited compliance with HEP this past week. Pt instructed to continue focusing on glute/ core exercises until next week. Will progress return to run program if appropriate.    Pt prognosis is Good.     Pt will continue to benefit from skilled outpatient physical therapy to address the deficits listed in the problem list box on initial evaluation, provide pt/family education and to maximize pt's level of independence in the home and community environment.     Pt's spiritual, cultural and educational needs considered and pt agreeable to plan of care and goals.     Anticipated barriers to physical therapy: none    Goals:  Short Term Goals: 6 weeks   1. Patient will demonstrate compliance with HEP at least 4 days/week to maximize therapeutic potential  2. Patient will demonstrate at least a 1 cm decrease in L knee edema  3. Patient will demonstrate less than 10% ROM discrepency R knee to L knee   4. Patient will tolerate DL dead lift with 20 lbs, exhibiting proper body mechanics     Long Term Goals: 12 weeks   1. Patient to demonstrate full knee ROM, exhibiting symmetrical knee ROM (left knee to right knee)  2. Patient will be able to complete work-related duties with less than 2/10 pain  3. Patient will demonstrate at least a 2 cm decrease in L knee edema, exhibiting symmetrical knee  girth in B knees  4. Patient will tolerate DL dead lift with 20 lbs, exhibiting proper body mechanics      Plan       Continue with established Plan of Care towards PT goals.     Dk Shay, PT, DPT, SCS

## 2022-03-04 ENCOUNTER — CLINICAL SUPPORT (OUTPATIENT)
Dept: REHABILITATION | Facility: HOSPITAL | Age: 37
End: 2022-03-04
Payer: COMMERCIAL

## 2022-03-04 DIAGNOSIS — M25.561 ACUTE PAIN OF RIGHT KNEE: Primary | ICD-10-CM

## 2022-03-04 PROCEDURE — 97110 THERAPEUTIC EXERCISES: CPT

## 2022-03-09 NOTE — PROGRESS NOTES
"  Physical Therapy Daily Treatment Note     Name: Al Moreno  Clinic Number: [id number]    Therapy Diagnosis:   Encounter Diagnosis   Name Primary?    Acute pain of right knee Yes     Physician: Rachid Mclain III, *    Visit Date: 3/4/2022    Physician Orders: PT Eval and Treat   Medical Diagnosis from Referral:M23.204 (ICD-10-CM) - Old complex tear of medial meniscus of left knee  Evaluation Date: 12/3/2021  Authorization Period Expiration: 12/31/2021                                   Plan of Care Expiration: 12/31/2021  Visit # / Visits authorized:5/ 20    Time In: 310 (pt w/ late arrival)  Time Out: 400  Total Billable Time: 50 minutes    Precautions: Standard    POSTOPERATIVE PLAN: We will be following the arthroscopic partial meniscectomy guidelines with emphasis on patellar mobility    Subjective     Pt reports: he has been busy with work was not able to do as much HEP as prescribed but plans to do more this upcoming week  He was compliant with home exercise program.  Response to previous treatment/Functional change:  tolerating HEP well  Pain: 0/10  Location: left knee      Objective     Range of Motion:   Knee Right Left   Active 2-0-135 2-0-135   Passive 5-0-135 5-0-135     Al received therapeutic exercises to develop strength, endurance, ROM and flexibility for 50 minutes including:    Bike 5 min lvl 5 for tissue extensibility/ pain modulation  SLR w 3" hold 3x10 2#  SL hip abduction 2# 3x10  Plank 3x15"  Deadbug 2x10  DL bridge GTB 2x10  SL bridge 2x10  SL hip abduction 3x10 ea    GTB clamshell 2x10 3" hold  Prone hip extension 2x10  LAQ 15# 3x10  Return to run hop progression    - 3x30 hops DL   - 3x20 hops SL on R    NP today  Cat/cow x10  6 inch lateral step down 3x10    Lateral lunge 3x10   Shuttle press DL jumps 2 cord 3x10  Shuttle press SL hops 1.5 cord 3x10  Hip hinge w/ sport cord at waist 3x20  Prone FRANCISCA 2x10  Prone quad stretch 3x30"  SAQ 2x20 3" hold        Home Exercises Provided and " Patient Education Provided     Education provided:   - POC, prognosis, HEP, activity modifications  -Progress toward goals     Written Home Exercises Provided: yes.  Exercises were reviewed and Al was able to demonstrate them prior to the end of the session.  Al demonstrated good  understanding of the education provided.     See EMR under Media for exercises provided 12/10/2021.    Assessment   Al presents with good carryover of ROM. Pt continues to report difficulty achieving training effect in glutes with closed chain strengthening. Pt initially reports not feeling confident with R LE hops, following open chain quad strengthening pt reports improved tolerance. Pt instructed to work on glute strengthening until next visit. Will progress return to run as tolerated.    Pt prognosis is Good.     Pt will continue to benefit from skilled outpatient physical therapy to address the deficits listed in the problem list box on initial evaluation, provide pt/family education and to maximize pt's level of independence in the home and community environment.     Pt's spiritual, cultural and educational needs considered and pt agreeable to plan of care and goals.     Anticipated barriers to physical therapy: none    Goals:  Short Term Goals: 6 weeks   1. Patient will demonstrate compliance with HEP at least 4 days/week to maximize therapeutic potential  2. Patient will demonstrate at least a 1 cm decrease in L knee edema  3. Patient will demonstrate less than 10% ROM discrepency R knee to L knee   4. Patient will tolerate DL dead lift with 20 lbs, exhibiting proper body mechanics     Long Term Goals: 12 weeks   1. Patient to demonstrate full knee ROM, exhibiting symmetrical knee ROM (left knee to right knee)  2. Patient will be able to complete work-related duties with less than 2/10 pain  3. Patient will demonstrate at least a 2 cm decrease in L knee edema, exhibiting symmetrical knee girth in B knees  4. Patient will  tolerate DL dead lift with 20 lbs, exhibiting proper body mechanics      Plan       Continue with established Plan of Care towards PT goals.     Dk Shay, PT, DPT, SCS

## 2022-04-12 ENCOUNTER — OFFICE VISIT (OUTPATIENT)
Dept: DERMATOLOGY | Facility: CLINIC | Age: 37
End: 2022-04-12
Payer: COMMERCIAL

## 2022-04-12 DIAGNOSIS — B36.0 TINEA VERSICOLOR: Primary | ICD-10-CM

## 2022-04-12 DIAGNOSIS — L72.3 INFLAMED SEBACEOUS CYST: ICD-10-CM

## 2022-04-12 PROCEDURE — 11900 PR INJECTION INTO SKIN LESIONS, UP TO 7: ICD-10-PCS | Mod: S$GLB,,, | Performed by: DERMATOLOGY

## 2022-04-12 PROCEDURE — 1159F PR MEDICATION LIST DOCUMENTED IN MEDICAL RECORD: ICD-10-PCS | Mod: CPTII,S$GLB,, | Performed by: DERMATOLOGY

## 2022-04-12 PROCEDURE — 99999 PR PBB SHADOW E&M-EST. PATIENT-LVL II: ICD-10-PCS | Mod: PBBFAC,,, | Performed by: DERMATOLOGY

## 2022-04-12 PROCEDURE — 99213 PR OFFICE/OUTPT VISIT, EST, LEVL III, 20-29 MIN: ICD-10-PCS | Mod: 25,S$GLB,, | Performed by: DERMATOLOGY

## 2022-04-12 PROCEDURE — 1160F PR REVIEW ALL MEDS BY PRESCRIBER/CLIN PHARMACIST DOCUMENTED: ICD-10-PCS | Mod: CPTII,S$GLB,, | Performed by: DERMATOLOGY

## 2022-04-12 PROCEDURE — 1160F RVW MEDS BY RX/DR IN RCRD: CPT | Mod: CPTII,S$GLB,, | Performed by: DERMATOLOGY

## 2022-04-12 PROCEDURE — 99999 PR PBB SHADOW E&M-EST. PATIENT-LVL II: CPT | Mod: PBBFAC,,, | Performed by: DERMATOLOGY

## 2022-04-12 PROCEDURE — 99213 OFFICE O/P EST LOW 20 MIN: CPT | Mod: 25,S$GLB,, | Performed by: DERMATOLOGY

## 2022-04-12 PROCEDURE — 1159F MED LIST DOCD IN RCRD: CPT | Mod: CPTII,S$GLB,, | Performed by: DERMATOLOGY

## 2022-04-12 PROCEDURE — 11900 INJECT SKIN LESIONS </W 7: CPT | Mod: S$GLB,,, | Performed by: DERMATOLOGY

## 2022-04-12 NOTE — PROGRESS NOTES
Subjective:       Patient ID:  Al Moreno is a 36 y.o. male who presents for   Chief Complaint   Patient presents with    Cyst     Behind L ear     Pt presents for cyst  Pt last seen 10/2021 for tinea versicolor - pt states is stable    Patient with new complaint of cyst(s)  Location: Behind L ear  Duration: few weeks  Symptoms: none  Relieving factors/Previous treatments: none    has oily skin and has noted cysts around ears (father also has). This one is inflamed and swollen. Nothing has drained (he has tried to pop it). Not hurting.    Asks about skin care routine for bumps on face.    Review of Systems   Skin: Positive for activity-related sunscreen use. Negative for daily sunscreen use.        Objective:    Physical Exam   Constitutional: He appears well-developed and well-nourished. No distress.   Neurological: He is alert and oriented to person, place, and time. He is not disoriented.   Psychiatric: He has a normal mood and affect.   Skin:   Areas Examined (abnormalities noted in diagram):   Scalp / Hair Palpated and Inspected  Head / Face Inspection Performed                  Diagram Legend     Erythematous scaling macule/papule c/w actinic keratosis       Vascular papule c/w angioma      Pigmented verrucoid papule/plaque c/w seborrheic keratosis      Yellow umbilicated papule c/w sebaceous hyperplasia      Irregularly shaped tan macule c/w lentigo     1-2 mm smooth white papules consistent with Milia      Movable subcutaneous cyst with punctum c/w epidermal inclusion cyst      Subcutaneous movable cyst c/w pilar cyst      Firm pink to brown papule c/w dermatofibroma      Pedunculated fleshy papule(s) c/w skin tag(s)      Evenly pigmented macule c/w junctional nevus     Mildly variegated pigmented, slightly irregular-bordered macule c/w mildly atypical nevus      Flesh colored to evenly pigmented papule c/w intradermal nevus       Pink pearly papule/plaque c/w basal cell carcinoma      Erythematous  hyperkeratotic cursted plaque c/w SCC      Surgical scar with no sign of skin cancer recurrence      Open and closed comedones      Inflammatory papules and pustules      Verrucoid papule consistent consistent with wart     Erythematous eczematous patches and plaques     Dystrophic onycholytic nail with subungual debris c/w onychomycosis     Umbilicated papule    Erythematous-base heme-crusted tan verrucoid plaque consistent with inflamed seborrheic keratosis     Erythematous Silvery Scaling Plaque c/w Psoriasis     See annotation      Assessment / Plan:        Tinea versicolor  Resolved  Will let me know if needs refills of keto cream    Inflamed sebaceous cyst - may need punch excision  Intralesional Kenalog 10mg/cc (0.4 cc total) injected into 1 lesions on the left neck today after obtaining verbal consent including risk of surrounding hypopigmentation. Patient tolerated procedure well.    Units: 1  NDC for Kenalog 10mg/cc:  3980-6636-69    Other orders  -     triamcinolone acetonide injection 10 mg    Sebaceous hyperplasia and comedones  cerave oily skin facewash  Adapalene 0.1 gel qhs           Follow up in about 4 weeks (around 5/10/2022).

## 2022-04-14 ENCOUNTER — CLINICAL SUPPORT (OUTPATIENT)
Dept: REHABILITATION | Facility: HOSPITAL | Age: 37
End: 2022-04-14
Payer: COMMERCIAL

## 2022-04-14 DIAGNOSIS — G89.29 CHRONIC PAIN OF RIGHT KNEE: Primary | ICD-10-CM

## 2022-04-14 DIAGNOSIS — M25.561 CHRONIC PAIN OF RIGHT KNEE: Primary | ICD-10-CM

## 2022-04-14 PROCEDURE — 97110 THERAPEUTIC EXERCISES: CPT

## 2022-04-14 NOTE — PROGRESS NOTES
"  Physical Therapy Daily Treatment Note     Name: Al Moreno  Clinic Number: [id number]    Therapy Diagnosis:   Encounter Diagnosis   Name Primary?    Chronic pain of right knee Yes     Physician: Rachid Mclain III, *    Visit Date: 4/14/2022    Physician Orders: PT Eval and Treat   Medical Diagnosis from Referral:M23.204 (ICD-10-CM) - Old complex tear of medial meniscus of left knee  Evaluation Date: 12/3/2021  Authorization Period Expiration: 6/1/22                                Plan of Care Expiration: extend to 7/1/22  Visit # / Visits authorized:6/ 20    Procedure(s) (LRB): 11/30/21  ARTHROSCOPY, KNEE, WITH MEDIAL AND LATERAL MENISCECTOMY (Left)  CHONDROPLASTY, KNEE (Left)  EXCISION, PLICA, KNEE, ARTHROSCOPIC (Left)  PARTIAL SYNOVECTOMY, KNEE (Left)  DEBRIDEMENT, KNEE (Left)  [id number] (Left)    Time In: 410 (pt w/ late arrival)  Time Out: 500  Total Billable Time: 50 minutes    Precautions: Standard    POSTOPERATIVE PLAN: We will be following the arthroscopic partial meniscectomy guidelines with emphasis on patellar mobility    Subjective     Pt reports: he hasn't been as good with his HEP 2/2 work. Hoping to get on better routine in upcoming weeks. Hasn't attempted running yet  He was compliant with home exercise program.  Response to previous treatment/Functional change:  No inc in pain  Pain: 0/10  Location: left knee      Objective     Pt approx 4 months 2 wks post op    Range of Motion:   Knee Right Left   Active 2-0-135 2-0-135   Passive 5-0-135 5-0-135     MMT:  Hip ext: R = 4-/5  L = 4-/5  Hip abd: R = 3+/5   L = 3+/5  Knee ext: R = 4-/5   L = 4+/5    Al received therapeutic exercises to develop strength, endurance, ROM and flexibility for 50 minutes including:    Bike 5 min lvl 5 for tissue extensibility/ pain modulation  DL bridge BTB 2x10  SL bridge 2x10  SL hip abduction 3x10 ea    BTB clamshell 2x10 3" hold  SL squat 3x10  LAQ 15# 3x10  Return to run hop progression    - " "3x30 hops DL   - 3x20 hops SL on R    NP today  Cat/cow x10  Prone hip extension 2x10  6 inch lateral step down 3x10    Lateral lunge 3x10   Prone FRANCISAC 2x10  Prone quad stretch 3x30"  SAQ 2x20 3" hold  SLR w 3" hold 3x10 2#  SL hip abduction 2# 3x10  Plank 3x15"  Deadbug 2x10      Home Exercises Provided and Patient Education Provided     Education provided:   - POC, prognosis, HEP, activity modifications  -Progress toward goals     Written Home Exercises Provided: yes.  Exercises were reviewed and Al was able to demonstrate them prior to the end of the session.  Al demonstrated good  understanding of the education provided.     See EMR under Media for exercises provided 12/10/2021.    Assessment   Al presents with good carryover of ROM. Pt continues to report difficulty achieving training effect in glutes with closed chain strengthening. Pt initially reports not feeling confident with R LE hops, following open chain quad strengthening pt reports improved tolerance. Pt instructed to work on glute strengthening until next visit. Will progress return to run as tolerated.    Pt prognosis is Good.     Pt will continue to benefit from skilled outpatient physical therapy to address the deficits listed in the problem list box on initial evaluation, provide pt/family education and to maximize pt's level of independence in the home and community environment.     Pt's spiritual, cultural and educational needs considered and pt agreeable to plan of care and goals.     Anticipated barriers to physical therapy: none    Goals:  Short Term Goals: 6 weeks   1. Patient will demonstrate compliance with HEP at least 4 days/week to maximize therapeutic potential - met  2. Patient will demonstrate at least a 1 cm decrease in L knee edema - met  3. Patient will demonstrate less than 10% ROM discrepency R knee to L knee - met   4. Patient will tolerate DL dead lift with 20 lbs, exhibiting proper body mechanics -----progressing not " met 4/14/2022     Long Term Goals: 12 weeks   1. Patient to demonstrate full knee ROM, exhibiting symmetrical knee ROM (left knee to right knee) - met  2. Patient will be able to complete work-related duties with less than 2/10 pain - met  3. Patient will demonstrate at least a 2 cm decrease in L knee edema, exhibiting symmetrical knee girth in B knees - met  4. Patient will tolerate DL dead lift with 20 lbs, exhibiting proper body mechanics - met    New revised goals: within 8 weeks  1.  Pt will be able to jog 2 miles on level surface reporting no pain  2. Pt will be able to pass [employer] sports cord test to demonstrate sufficient strength for return to sport      Plan       Continue with established Plan of Care towards PT goals.     Dk Shay, PT, DPT, SCS

## 2022-04-29 ENCOUNTER — CLINICAL SUPPORT (OUTPATIENT)
Dept: REHABILITATION | Facility: HOSPITAL | Age: 37
End: 2022-04-29
Payer: COMMERCIAL

## 2022-04-29 DIAGNOSIS — G89.29 CHRONIC PAIN OF RIGHT KNEE: Primary | ICD-10-CM

## 2022-04-29 DIAGNOSIS — M25.561 CHRONIC PAIN OF RIGHT KNEE: Primary | ICD-10-CM

## 2022-04-29 PROCEDURE — 97110 THERAPEUTIC EXERCISES: CPT

## 2022-04-29 NOTE — PROGRESS NOTES
Re-assess    Physical Therapy Daily Treatment Note     Name: Al Moreno  Clinic Number: [id number]    Therapy Diagnosis:   Encounter Diagnosis   Name Primary?    Chronic pain of right knee Yes     Physician: Rachid Mclain III, *    Visit Date: 2022    Physician Orders: PT Eval and Treat   Medical Diagnosis from Referral:M23.204 (ICD-10-CM) - Old complex tear of medial meniscus of left knee  Evaluation Date: 12/3/2021  Authorization Period Expiration: 22                                Plan of Care Expiration: extend to 22  Visit # / Visits authorized:    Procedure(s) (LRB): 21  ARTHROSCOPY, KNEE, WITH MEDIAL AND LATERAL MENISCECTOMY (Left)  CHONDROPLASTY, KNEE (Left)  EXCISION, PLICA, KNEE, ARTHROSCOPIC (Left)  PARTIAL SYNOVECTOMY, KNEE (Left)  DEBRIDEMENT, KNEE (Left)  [id number] (Left)    Time In: 305 (pt w/ late arrival)  Time Out: 400  Total Billable Time: 55 minutes    Precautions: Standard    POSTOPERATIVE PLAN: We will be following the arthroscopic partial meniscectomy guidelines with emphasis on patellar mobility    Subjective     Pt reports: he has been a little better with HEP, trying to integrate it throughout the day at work. Wants to continue progressing return to sport   He was compliant with home exercise program.  Response to previous treatment/Functional change:  No inc in pain  Pain: 0/10  Location: left knee      Objective     Pt approx 5 months post op     Range of Motion:   Knee Right Left   Active 2-0-135 2-0-135   Passive 5-0-135 5-0-135     Bridge test (-) bilat  MMT:  Hip ext: R = 4-/5  L = 4-/5  Hip abd: R = 4/5   L = 4/5  Knee ext: R = 4/5   L = 4+/5    [employer] sports cord test:  (able to perform 50% of passing score w/ sound form)   SL squat: 1:30 sec w/ black cord  Lateral boundin min  Forward jo min  Backpedal: 1 min    Al received therapeutic exercises to develop strength, endurance, ROM and flexibility for 55 minutes  "including:    Bike 5 min lvl 5 for tissue extensibility/ pain modulation  DL bridge BTB 2x10  SL bridge 2x10  SLR w 3" hold 3x10 2#  SL hip abduction 2# 3x10    BTB clamshell 2x10 3" hold  SL squat 3x10  LAQ 15# 3x10  Modified [employer] sports cord test    NP today  Cat/cow x10  Prone hip extension 2x10  6 inch lateral step down 3x10    Lateral lunge 3x10   Prone FRANCISCA 2x10  Prone quad stretch 3x30"  SAQ 2x20 3" hold  Plank 3x15"  Deadbug 2x10  Return to run hop progression    - 3x30 hops DL   - 3x20 hops SL on R        Home Exercises Provided and Patient Education Provided     Education provided:   - POC, prognosis, HEP, activity modifications  -Progress toward goals     Written Home Exercises Provided: yes.  Exercises were reviewed and Al was able to demonstrate them prior to the end of the session.  Al demonstrated good  understanding of the education provided.     See EMR under Media for exercises provided 12/10/2021.    Assessment   Al presents with improved glute strength on MMT since last assessed. Pt performed about 50% of [employer] sports cord test today with sound form reporting no pain in knees bilat. Pt educated on progressing strengthening routine and working in conditioning to tolerate return to sport testing. Will progress plyometrics next visit pending response to today's treatment    Pt prognosis is Good.     Pt will continue to benefit from skilled outpatient physical therapy to address the deficits listed in the problem list box on initial evaluation, provide pt/family education and to maximize pt's level of independence in the home and community environment.     Pt's spiritual, cultural and educational needs considered and pt agreeable to plan of care and goals.     Anticipated barriers to physical therapy: none    Goals:  Short Term Goals: 6 weeks   1. Patient will demonstrate compliance with HEP at least 4 days/week to maximize therapeutic potential - met  2. Patient will demonstrate at least a 1 " cm decrease in L knee edema - met  3. Patient will demonstrate less than 10% ROM discrepency R knee to L knee - met   4. Patient will tolerate DL dead lift with 20 lbs, exhibiting proper body mechanics - met     Long Term Goals: 12 weeks   1. Patient to demonstrate full knee ROM, exhibiting symmetrical knee ROM (left knee to right knee) - met  2. Patient will be able to complete work-related duties with less than 2/10 pain - met  3. Patient will demonstrate at least a 2 cm decrease in L knee edema, exhibiting symmetrical knee girth in B knees - met  4. Patient will tolerate DL dead lift with 20 lbs, exhibiting proper body mechanics - met    New revised goals: within 8 weeks  1.  Pt will be able to jog 2 miles on level surface reporting no pain -----progressing not met 4/29/2022  2. Pt will be able to pass [employer] sports cord test to demonstrate sufficient strength for return to sport -----progressing not met 4/29/2022      Plan       Continue with established Plan of Care towards PT goals.     Dk Shay, PT, DPT, SCS

## 2022-05-13 ENCOUNTER — OFFICE VISIT (OUTPATIENT)
Dept: DERMATOLOGY | Facility: CLINIC | Age: 37
End: 2022-05-13
Payer: COMMERCIAL

## 2022-05-13 ENCOUNTER — CLINICAL SUPPORT (OUTPATIENT)
Dept: REHABILITATION | Facility: HOSPITAL | Age: 37
End: 2022-05-13
Payer: COMMERCIAL

## 2022-05-13 DIAGNOSIS — M25.561 ACUTE PAIN OF RIGHT KNEE: Primary | ICD-10-CM

## 2022-05-13 DIAGNOSIS — L72.3 INFLAMED SEBACEOUS CYST: Primary | ICD-10-CM

## 2022-05-13 PROCEDURE — 99212 OFFICE O/P EST SF 10 MIN: CPT | Mod: 25,S$GLB,, | Performed by: DERMATOLOGY

## 2022-05-13 PROCEDURE — 99999 PR PBB SHADOW E&M-EST. PATIENT-LVL I: CPT | Mod: PBBFAC,,, | Performed by: DERMATOLOGY

## 2022-05-13 PROCEDURE — 97110 THERAPEUTIC EXERCISES: CPT

## 2022-05-13 PROCEDURE — 99999 PR PBB SHADOW E&M-EST. PATIENT-LVL I: ICD-10-PCS | Mod: PBBFAC,,, | Performed by: DERMATOLOGY

## 2022-05-13 PROCEDURE — 99212 PR OFFICE/OUTPT VISIT, EST, LEVL II, 10-19 MIN: ICD-10-PCS | Mod: 25,S$GLB,, | Performed by: DERMATOLOGY

## 2022-05-13 NOTE — PROGRESS NOTES
Subjective:       Patient ID:  Al Moreno is a 36 y.o. male who presents for   Chief Complaint   Patient presents with    Cyst     ear     Patient is a 37 yo male present for cyst on ear. Previous K10 injection     Cyst    seen last month and had Kenalog injection for inflamed cyst left ear base.  It is improved. Has not drained. Not painful anymore.    Review of Systems     Objective:    Physical Exam   Constitutional: He appears well-developed and well-nourished.   Neurological: He is alert and oriented to person, place, and time.   Psychiatric: He has a normal mood and affect.   Skin:   Areas Examined (abnormalities noted in diagram):   Head / Face Inspection Performed              Diagram Legend     Erythematous scaling macule/papule c/w actinic keratosis       Vascular papule c/w angioma      Pigmented verrucoid papule/plaque c/w seborrheic keratosis      Yellow umbilicated papule c/w sebaceous hyperplasia      Irregularly shaped tan macule c/w lentigo     1-2 mm smooth white papules consistent with Milia      Movable subcutaneous cyst with punctum c/w epidermal inclusion cyst      Subcutaneous movable cyst c/w pilar cyst      Firm pink to brown papule c/w dermatofibroma      Pedunculated fleshy papule(s) c/w skin tag(s)      Evenly pigmented macule c/w junctional nevus     Mildly variegated pigmented, slightly irregular-bordered macule c/w mildly atypical nevus      Flesh colored to evenly pigmented papule c/w intradermal nevus       Pink pearly papule/plaque c/w basal cell carcinoma      Erythematous hyperkeratotic cursted plaque c/w SCC      Surgical scar with no sign of skin cancer recurrence      Open and closed comedones      Inflammatory papules and pustules      Verrucoid papule consistent consistent with wart     Erythematous eczematous patches and plaques     Dystrophic onycholytic nail with subungual debris c/w onychomycosis     Umbilicated papule    Erythematous-base heme-crusted tan verrucoid  plaque consistent with inflamed seborrheic keratosis     Erythematous Silvery Scaling Plaque c/w Psoriasis     See annotation      Assessment / Plan:        Inflamed sebaceous cyst - improved, no papule felt but still soft in area  Reviewed punch excision in future if does not resolve and continues to be bothersome to pt    -     triamcinolone acetonide injection 10 mg      Intralesional Kenalog 5mg/cc (0.3 cc total) injected into 1 lesions on the left base of ear today after obtaining verbal consent including risk of surrounding hypopigmentation. Patient tolerated procedure well.    Units: 1  NDC for Kenalog 10mg/cc:  3089-4207-18           Follow up if symptoms worsen or fail to improve.

## 2022-05-16 NOTE — PROGRESS NOTES
Re-assess    Physical Therapy Daily Treatment Note     Name: Al Moreno  Clinic Number: [id number]    Therapy Diagnosis:   Encounter Diagnosis   Name Primary?    Acute pain of right knee Yes     Physician: Rachid Mclain III, *    Visit Date: 5/13/2022    Physician Orders: PT Eval and Treat   Medical Diagnosis from Referral:M23.204 (ICD-10-CM) - Old complex tear of medial meniscus of left knee  Evaluation Date: 12/3/2021  Authorization Period Expiration: 6/1/22                                Plan of Care Expiration: extend to 7/1/22  Visit # / Visits authorized:8/ 20    Procedure(s) (LRB): 11/30/21  ARTHROSCOPY, KNEE, WITH MEDIAL AND LATERAL MENISCECTOMY (Left)  CHONDROPLASTY, KNEE (Left)  EXCISION, PLICA, KNEE, ARTHROSCOPIC (Left)  PARTIAL SYNOVECTOMY, KNEE (Left)  DEBRIDEMENT, KNEE (Left)  [id number] (Left)    Time In: 206 (pt w/ late arrival)  Time Out: 300  Total Billable Time: 54 minutes    Precautions: Standard    POSTOPERATIVE PLAN: We will be following the arthroscopic partial meniscectomy guidelines with emphasis on patellar mobility    Subjective     Pt reports: no pain after last visit. Has been trying to do as much HEP as possible multitasking at work. Would like to attempt [place] sports cord test today to assess readiness for return to recreational sport  He was compliant with home exercise program.  Response to previous treatment/Functional change:  No inc in pain  Pain: 0/10  Location: left knee      Objective     Pt approx 5 months 2 wks post op     Range of Motion:   Knee Right Left   Active 2-0-135 2-0-135   Passive 5-0-135 5-0-135     Bridge test (-) bilat  MMT:  Hip ext: R = 4-/5  L = 4-/5  Hip abd: R = 4/5   L = 4/5  Knee ext: R = 4/5   L = 4+/5    [place] sports cord test:  (able to perform 50% of passing score w/ sound form)   SL squat performed bilat: 1:30 sec w/ black cord (L LE limited by pain, R LE limited by endurance)      Al received therapeutic exercises to develop  "strength, endurance, ROM and flexibility for 55 minutes including:    Bike 5 min lvl 5 for tissue extensibility/ pain modulation  LAQ 10# 3x15  DL bridge BTB 2x10  SL bridge 2x10  Dynamic warm up test 10 min  [place] sports cord test  SLR w 3" hold 3x10 2#  SL hip abduction 2# 3x10    BTB clamshell 2x10 3" hold  Pt education on progressing strengthening, squat mechanics, progress towards goals    NP today  Cat/cow x10  Prone hip extension 2x10  6 inch lateral step down 3x10    Lateral lunge 3x10   Prone FRANCISCA 2x10  Prone quad stretch 3x30"  SAQ 2x20 3" hold  Plank 3x15"  Deadbug 2x10  Return to run hop progression    - 3x30 hops DL   - 3x20 hops SL on R  SL squat 3x10        Home Exercises Provided and Patient Education Provided     Education provided:   - POC, prognosis, HEP, activity modifications  -Progress toward goals     Written Home Exercises Provided: yes.  Exercises were reviewed and Al was able to demonstrate them prior to the end of the session.  Al demonstrated good  understanding of the education provided.     See EMR under Media for exercises provided 12/10/2021.    Assessment   Al presents with symmetrical knee ROM, stretch reported with overpressure. Pt tolerates SL squat [place] sports cord test fairly well but pain reported when performing SL squat on L. Pt reports improved tolerance to SL squat following SLR/ glute activation exercise. Will see pt in 2 weeks and potentially retest vail pending HEP compliance.    Pt prognosis is Good.     Pt will continue to benefit from skilled outpatient physical therapy to address the deficits listed in the problem list box on initial evaluation, provide pt/family education and to maximize pt's level of independence in the home and community environment.     Pt's spiritual, cultural and educational needs considered and pt agreeable to plan of care and goals.     Anticipated barriers to physical therapy: none    Goals:  Short Term Goals: 6 weeks   1. Patient will " demonstrate compliance with HEP at least 4 days/week to maximize therapeutic potential - met  2. Patient will demonstrate at least a 1 cm decrease in L knee edema - met  3. Patient will demonstrate less than 10% ROM discrepency R knee to L knee - met   4. Patient will tolerate DL dead lift with 20 lbs, exhibiting proper body mechanics - met     Long Term Goals: 12 weeks   1. Patient to demonstrate full knee ROM, exhibiting symmetrical knee ROM (left knee to right knee) - met  2. Patient will be able to complete work-related duties with less than 2/10 pain - met  3. Patient will demonstrate at least a 2 cm decrease in L knee edema, exhibiting symmetrical knee girth in B knees - met  4. Patient will tolerate DL dead lift with 20 lbs, exhibiting proper body mechanics - met    New revised goals: within 8 weeks  1.  Pt will be able to jog 2 miles on level surface reporting no pain -----progressing not met 5/13/2022  2. Pt will be able to pass [place] sports cord test to demonstrate sufficient strength for return to sport -----progressing not met 5/13/2022      Plan       Continue with established Plan of Care towards PT goals.     Dk Shay, PT, DPT, SCS

## 2022-06-10 ENCOUNTER — CLINICAL SUPPORT (OUTPATIENT)
Dept: REHABILITATION | Facility: HOSPITAL | Age: 37
End: 2022-06-10
Payer: COMMERCIAL

## 2022-06-10 DIAGNOSIS — M25.561 CHRONIC PAIN OF RIGHT KNEE: Primary | ICD-10-CM

## 2022-06-10 DIAGNOSIS — G89.29 CHRONIC PAIN OF RIGHT KNEE: Primary | ICD-10-CM

## 2022-06-10 PROCEDURE — 97110 THERAPEUTIC EXERCISES: CPT

## 2022-06-24 ENCOUNTER — CLINICAL SUPPORT (OUTPATIENT)
Dept: REHABILITATION | Facility: HOSPITAL | Age: 37
End: 2022-06-24
Payer: COMMERCIAL

## 2022-06-24 DIAGNOSIS — M25.561 ACUTE PAIN OF RIGHT KNEE: Primary | ICD-10-CM

## 2022-06-24 PROCEDURE — 97110 THERAPEUTIC EXERCISES: CPT

## 2022-06-30 NOTE — PROGRESS NOTES
"  Physical Therapy Daily Treatment Note     Name: Al Moreno  Clinic Number: [id number]    Therapy Diagnosis:   Encounter Diagnosis   Name Primary?    Acute pain of right knee Yes     Physician: Rachid Mclain III, *    Visit Date: 6/24/2022    Physician Orders: PT Eval and Treat   Medical Diagnosis from Referral:M23.204 (ICD-10-CM) - Old complex tear of medial meniscus of left knee  Evaluation Date: 12/3/2021  Authorization Period Expiration: 12/31/22                                Plan of Care Expiration: extend to 8/1/22  Visit # / Visits authorized 10/ 20    Procedure(s) (LRB): 11/30/21  ARTHROSCOPY, KNEE, WITH MEDIAL AND LATERAL MENISCECTOMY (Left)  CHONDROPLASTY, KNEE (Left)  EXCISION, PLICA, KNEE, ARTHROSCOPIC (Left)  PARTIAL SYNOVECTOMY, KNEE (Left)  DEBRIDEMENT, KNEE (Left)  [id number] (Left)    Time In: 300  Time Out: 400  Total Billable Time: 60 minutes    Precautions: Standard    POSTOPERATIVE PLAN: We will be following the arthroscopic partial meniscectomy guidelines with emphasis on patellar mobility    Subjective     Pt reports: continues compliance with HEP. Tolerating all quad strengthening exercises well. Tried to do as much of return to run program, endorses good tolerance. Pt leaving for vacation next week, feels ready to initiate transition to independent HEP upon return  He was compliant with home exercise program.  Response to previous treatment/Functional change:  No inc in pain  Pain: 0/10  Location: left knee      Objective     Pt approx 7 months post op     Range of Motion:   Knee Right Left   Active 3-0-135 3-0-135   Passive 5-0-135 5-0-135     Bridge test (-) wild Melendez received therapeutic exercises to develop strength, endurance, ROM and flexibility for 60 minutes including:    Bike 5 min lvl 5 for tissue extensibility/ pain modulation  LAQ 12.5# 4x15  DL bridge BTB 2x10  SL bridge x10  SLR w 3" hold 3x10 2#  SL hip abduction 2x15  Dynamic warm up test 5 " "min  Captain jammie 2x15  24" box pistol 3x15  6 inch lateral step down 3x10    Wall sit 3x30"  SL squat [id number] to run hop progression    - 3x30 hops DL   - 3x20 hops SL on R    Pt education on progressing strengthening, squat mechanics, progress towards goals    NP today  Quad isometric at 60 deg knee flexion 30" hold x5  SL hip abduction 2# 3x10    BTB clamshell 2x10 3" hold  Cat/cow x10  Prone hip extension 2x10  Lateral lunge 3x10   Prone FRANCISCA 2x10  Prone quad stretch 3x30"  SAQ 2x20 3" hold  Plank 3x15"  Deadbug 2x10      Home Exercises Provided and Patient Education Provided     Education provided:   - POC, prognosis, HEP, activity modifications  -Progress toward goals     Written Home Exercises Provided: yes.  Exercises were reviewed and Al was able to demonstrate them prior to the end of the session.  Al demonstrated good  understanding of the education provided.     See EMR under Media for exercises provided 12/10/2021.    Assessment   Al presents with significantly improved tolerance to closed chain strengthening today. Pt reporting training effect in glutes/ quads, no knee pain reported with exercise progressions. Pt encouraged to try continued compliance with SLR / wall sits over vacation. Will re-assess quad strength and progress as tolerated.    Pt prognosis is Good.     Pt will continue to benefit from skilled outpatient physical therapy to address the deficits listed in the problem list box on initial evaluation, provide pt/family education and to maximize pt's level of independence in the home and community environment.     Pt's spiritual, cultural and educational needs considered and pt agreeable to plan of care and goals.     Anticipated barriers to physical therapy: none    Goals:  Short Term Goals: 6 weeks   1. Patient will demonstrate compliance with HEP at least 4 days/week to maximize therapeutic potential - met  2. Patient will demonstrate at least a 1 cm decrease in L knee edema " - met  3. Patient will demonstrate less than 10% ROM discrepency R knee to L knee - met   4. Patient will tolerate DL dead lift with 20 lbs, exhibiting proper body mechanics - met     Long Term Goals: 12 weeks   1. Patient to demonstrate full knee ROM, exhibiting symmetrical knee ROM (left knee to right knee) - met  2. Patient will be able to complete work-related duties with less than 2/10 pain - met  3. Patient will demonstrate at least a 2 cm decrease in L knee edema, exhibiting symmetrical knee girth in B knees - met  4. Patient will tolerate DL dead lift with 20 lbs, exhibiting proper body mechanics - met    New revised goals: within 8 weeks  1.  Pt will be able to jog 2 miles on level surface reporting no pain -----progressing not met 6/24/2022  2. Pt will be able to pass [place] sports cord test to demonstrate sufficient strength for return to sport -----progressing not met 6/24/2022      Plan       Continue with established Plan of Care towards PT goals.     Dk Shay, PT, DPT, SCS

## 2022-07-01 ENCOUNTER — TELEPHONE (OUTPATIENT)
Dept: DERMATOLOGY | Facility: CLINIC | Age: 37
End: 2022-07-01
Payer: COMMERCIAL

## 2022-07-22 ENCOUNTER — CLINICAL SUPPORT (OUTPATIENT)
Dept: REHABILITATION | Facility: HOSPITAL | Age: 37
End: 2022-07-22
Payer: COMMERCIAL

## 2022-07-22 DIAGNOSIS — M25.561 ACUTE PAIN OF RIGHT KNEE: Primary | ICD-10-CM

## 2022-07-22 PROCEDURE — 97110 THERAPEUTIC EXERCISES: CPT

## 2022-07-27 NOTE — PROGRESS NOTES
"  Physical Therapy Daily Treatment Note     Name: Al Moreno  Clinic Number: [id number]    Therapy Diagnosis:   Encounter Diagnosis   Name Primary?    Acute pain of right knee Yes     Physician: Rachid Mclain III, *    Visit Date: 7/22/2022    Physician Orders: PT Eval and Treat   Medical Diagnosis from Referral:M23.204 (ICD-10-CM) - Old complex tear of medial meniscus of left knee  Evaluation Date: 12/3/2021  Authorization Period Expiration: 12/31/22                                Plan of Care Expiration: extend to 9/1/22  Visit # / Visits authorized 11/ 20    Procedure(s) (LRB): 11/30/21  ARTHROSCOPY, KNEE, WITH MEDIAL AND LATERAL MENISCECTOMY (Left)  CHONDROPLASTY, KNEE (Left)  EXCISION, PLICA, KNEE, ARTHROSCOPIC (Left)  PARTIAL SYNOVECTOMY, KNEE (Left)  DEBRIDEMENT, KNEE (Left)  [id number] (Left)    Time In: 300  Time Out: 400  Total Billable Time: 60 minutes    Precautions: Standard    POSTOPERATIVE PLAN: We will be following the arthroscopic partial meniscectomy guidelines with emphasis on patellar mobility    Subjective     Pt reports:he tolerated hiking on vacation well. Tried to do some quad/ glute HEP that could be completed without equipment.   Pt would like to work toward ramping up return to sport HEP. Feels ready to start transition to independent HEP  He was compliant with home exercise program.  Response to previous treatment/Functional change:  Tolerated hiking well  Pain: 0/10  Location: left knee      Objective     Pt approx 8 months post op     Range of Motion:   Knee Right Left   Active 3-0-135 3-0-135   Passive 5-0-135 5-0-135     Bridge test (-) biltana Melendez received therapeutic exercises to develop strength, endurance, ROM and flexibility for 60 minutes including:    Bike 5 min lvl 5 for tissue extensibility/ pain modulation  LAQ 15# 4x15  DL bridge BTB 2x10  SL bridge x10  Deadbug 2x10  SLR w 3" hold 3x10 2#  SL hip abduction 2x15 2#  Dynamic warm up test 5 min  SL " "squat 2x10   6 inch lateral step down 3x10    [place] sports cord lateral bounding 2 black cord 3x1 min each way  Shuttle run 20 ft increments x4 3 rounds  Fig 8 run x3  Pt education on progressing strengthening, squat mechanics, progress towards goals    NP today  Quad isometric at 60 deg knee flexion 30" hold x5  SL hip abduction 2# 3x10    BTB clamshell 2x10 3" hold  Cat/cow x10  Prone hip extension 2x10  Lateral lunge 3x10   Prone FRANCISCA 2x10  Prone quad stretch 3x30"  SAQ 2x20 3" hold  Plank 3x15"    Captain jammie 2x15  24" box pistol 3x15      Home Exercises Provided and Patient Education Provided     Education provided:   - POC, prognosis, HEP, activity modifications  -Progress toward goals     Written Home Exercises Provided: yes.  Exercises were reviewed and Al was able to demonstrate them prior to the end of the session.  Al demonstrated good  understanding of the education provided.     See EMR under Media for exercises provided 12/10/2021.    Assessment   Al demonstrates good carryover of quad/ glute activation in both open/ closed chain. Pt tolerates [place] sports cord lateral bounding well. Pt benefits from cues for breaking down initially but improved carryover to subsequent sets. Will see pt in 1 month for potential d/c to independent HEP    Pt prognosis is Good.     Pt will continue to benefit from skilled outpatient physical therapy to address the deficits listed in the problem list box on initial evaluation, provide pt/family education and to maximize pt's level of independence in the home and community environment.     Pt's spiritual, cultural and educational needs considered and pt agreeable to plan of care and goals.     Anticipated barriers to physical therapy: none    Goals:  Short Term Goals: 6 weeks   1. Patient will demonstrate compliance with HEP at least 4 days/week to maximize therapeutic potential - met  2. Patient will demonstrate at least a 1 cm decrease in L knee edema - met  3. " Patient will demonstrate less than 10% ROM discrepency R knee to L knee - met   4. Patient will tolerate DL dead lift with 20 lbs, exhibiting proper body mechanics - met     Long Term Goals: 12 weeks   1. Patient to demonstrate full knee ROM, exhibiting symmetrical knee ROM (left knee to right knee) - met  2. Patient will be able to complete work-related duties with less than 2/10 pain - met  3. Patient will demonstrate at least a 2 cm decrease in L knee edema, exhibiting symmetrical knee girth in B knees - met  4. Patient will tolerate DL dead lift with 20 lbs, exhibiting proper body mechanics - met    New revised goals: within 8 weeks  1.  Pt will be able to jog 2 miles on level surface reporting no pain -----progressing not met 7/22/2022  2. Pt will be able to pass [place] sports cord test to demonstrate sufficient strength for return to sport -----progressing not met 7/22/2022      Plan       Continue with established Plan of Care towards PT goals.     Dk Shay, PT, DPT, SCS

## (undated) DEVICE — GLOVE BIOGEL SKINSENSE PI 7.0

## (undated) DEVICE — TUBE SET INFLOW/OUTFLOW

## (undated) DEVICE — BLADE 4.2MM PREBENT ULTRACUT

## (undated) DEVICE — COVER MAYO STAND REINFRCD 30

## (undated) DEVICE — SLEEVE PROTECTIVE 6X9 NON STER

## (undated) DEVICE — NDL 25G X 5/8 REG BEVEL

## (undated) DEVICE — NDL HYPO REG 25G X 1 1/2

## (undated) DEVICE — SYR 10CC LUER LOCK

## (undated) DEVICE — SEE MEDLINE ITEM 157169

## (undated) DEVICE — GOWN SMARTGOWN LVL4 X-LONG XL

## (undated) DEVICE — SYR B-D DISP CONTROL 10CC100/C

## (undated) DEVICE — CLOSURE SKIN STERI STRIP 1/2X4

## (undated) DEVICE — GOWN SMART IMP BREATHABLE XXLG

## (undated) DEVICE — UNDERGLOVES BIOGEL PI SIZE 7.5

## (undated) DEVICE — Device

## (undated) DEVICE — SOL IRR NACL .9% 3000ML

## (undated) DEVICE — ADHESIVE MASTISOL VIAL 48/BX

## (undated) DEVICE — GLOVE SURGEON SYN PF SZ 9

## (undated) DEVICE — PAD ABD 8X10 STERILE

## (undated) DEVICE — SOL 9P NACL IRR PIC IL

## (undated) DEVICE — PAD COLD THERAPY KNEE WRAP ON

## (undated) DEVICE — SUT MCRYL PLUS 4-0 PS2 27IN

## (undated) DEVICE — PAD ELECTRODE STER 1.5X3

## (undated) DEVICE — DRESSING XEROFORM FOIL PK 1X8

## (undated) DEVICE — GLOVE ORTHO PF SZ 8.5

## (undated) DEVICE — NDL 18GA X1 1/2 REG BEVEL

## (undated) DEVICE — GAUZE SPONGE 4X4 12PLY

## (undated) DEVICE — APPLICATOR CHLORAPREP ORN 26ML

## (undated) DEVICE — SEE MEDLINE ITEM 157117